# Patient Record
Sex: FEMALE | Race: WHITE | NOT HISPANIC OR LATINO | Employment: FULL TIME | URBAN - METROPOLITAN AREA
[De-identification: names, ages, dates, MRNs, and addresses within clinical notes are randomized per-mention and may not be internally consistent; named-entity substitution may affect disease eponyms.]

---

## 2018-09-18 ENCOUNTER — OFFICE VISIT CONVERTED (OUTPATIENT)
Dept: FAMILY MEDICINE CLINIC | Facility: CLINIC | Age: 53
End: 2018-09-18
Attending: NURSE PRACTITIONER

## 2018-09-18 ENCOUNTER — CONVERSION ENCOUNTER (OUTPATIENT)
Dept: FAMILY MEDICINE CLINIC | Facility: CLINIC | Age: 53
End: 2018-09-18

## 2019-05-10 ENCOUNTER — OFFICE VISIT CONVERTED (OUTPATIENT)
Dept: FAMILY MEDICINE CLINIC | Facility: CLINIC | Age: 54
End: 2019-05-10
Attending: NURSE PRACTITIONER

## 2019-05-10 ENCOUNTER — HOSPITAL ENCOUNTER (OUTPATIENT)
Dept: GENERAL RADIOLOGY | Facility: HOSPITAL | Age: 54
Discharge: HOME | End: 2019-05-10
Attending: NURSE PRACTITIONER

## 2019-06-12 ENCOUNTER — HOSPITAL ENCOUNTER (OUTPATIENT)
Dept: OTHER | Facility: HOSPITAL | Age: 54
Discharge: HOME | End: 2019-06-12
Attending: NURSE PRACTITIONER

## 2019-06-12 LAB
ALBUMIN SERPL-MCNC: 4.1 G/DL (ref 3.5–5)
ALBUMIN/GLOB SERPL: 1.4 {RATIO} (ref 1.4–2.6)
ALP SERPL-CCNC: 139 U/L (ref 53–141)
ALT SERPL-CCNC: 49 U/L (ref 10–40)
ANION GAP SERPL CALC-SCNC: 18 MMOL/L (ref 8–19)
AST SERPL-CCNC: 38 U/L (ref 15–50)
BASOPHILS # BLD AUTO: 0.03 10*3/UL (ref 0–0.2)
BASOPHILS NFR BLD AUTO: 0.5 % (ref 0–3)
BILIRUB SERPL-MCNC: 0.44 MG/DL (ref 0.2–1.3)
BUN SERPL-MCNC: 20 MG/DL (ref 5–25)
BUN/CREAT SERPL: 24 {RATIO} (ref 6–20)
CALCIUM SERPL-MCNC: 9.3 MG/DL (ref 8.7–10.4)
CHLORIDE SERPL-SCNC: 102 MMOL/L (ref 99–111)
CHOLEST SERPL-MCNC: 197 MG/DL (ref 107–200)
CHOLEST/HDLC SERPL: 3.5 {RATIO} (ref 3–6)
CONV ABS IMM GRAN: 0.01 10*3/UL (ref 0–0.2)
CONV CO2: 24 MMOL/L (ref 22–32)
CONV IMMATURE GRAN: 0.2 % (ref 0–1.8)
CONV TOTAL PROTEIN: 7 G/DL (ref 6.3–8.2)
CREAT UR-MCNC: 0.83 MG/DL (ref 0.5–0.9)
DEPRECATED RDW RBC AUTO: 40.9 FL (ref 36.4–46.3)
EOSINOPHIL # BLD AUTO: 0.11 10*3/UL (ref 0–0.7)
EOSINOPHIL # BLD AUTO: 2 % (ref 0–7)
ERYTHROCYTE [DISTWIDTH] IN BLOOD BY AUTOMATED COUNT: 12.8 % (ref 11.7–14.4)
EST. AVERAGE GLUCOSE BLD GHB EST-MCNC: 105 MG/DL
GFR SERPLBLD BASED ON 1.73 SQ M-ARVRAT: >60 ML/MIN/{1.73_M2}
GLOBULIN UR ELPH-MCNC: 2.9 G/DL (ref 2–3.5)
GLUCOSE SERPL-MCNC: 83 MG/DL (ref 65–99)
HBA1C MFR BLD: 13.1 G/DL (ref 12–16)
HBA1C MFR BLD: 5.3 % (ref 3.5–5.7)
HCT VFR BLD AUTO: 40.1 % (ref 37–47)
HDLC SERPL-MCNC: 57 MG/DL (ref 40–60)
LDLC SERPL CALC-MCNC: 118 MG/DL (ref 70–100)
LYMPHOCYTES # BLD AUTO: 1.75 10*3/UL (ref 1–5)
MCH RBC QN AUTO: 28.6 PG (ref 27–31)
MCHC RBC AUTO-ENTMCNC: 32.7 G/DL (ref 33–37)
MCV RBC AUTO: 87.6 FL (ref 81–99)
MONOCYTES # BLD AUTO: 0.46 10*3/UL (ref 0.2–1.2)
MONOCYTES NFR BLD AUTO: 8.3 % (ref 3–10)
NEUTROPHILS # BLD AUTO: 3.21 10*3/UL (ref 2–8)
NEUTROPHILS NFR BLD AUTO: 57.6 % (ref 30–85)
NRBC CBCN: 0 % (ref 0–0.7)
OSMOLALITY SERPL CALC.SUM OF ELEC: 290 MOSM/KG (ref 273–304)
PLATELET # BLD AUTO: 220 10*3/UL (ref 130–400)
PMV BLD AUTO: 10.6 FL (ref 9.4–12.3)
POTASSIUM SERPL-SCNC: 4.9 MMOL/L (ref 3.5–5.3)
RBC # BLD AUTO: 4.58 10*6/UL (ref 4.2–5.4)
SODIUM SERPL-SCNC: 139 MMOL/L (ref 135–147)
T4 FREE SERPL-MCNC: 1.4 NG/DL (ref 0.9–1.8)
TRIGL SERPL-MCNC: 109 MG/DL (ref 40–150)
TSH SERPL-ACNC: 1.65 M[IU]/L (ref 0.27–4.2)
VARIANT LYMPHS NFR BLD MANUAL: 31.4 % (ref 20–45)
VLDLC SERPL-MCNC: 22 MG/DL (ref 5–37)
WBC # BLD AUTO: 5.57 10*3/UL (ref 4.8–10.8)

## 2019-06-25 ENCOUNTER — OFFICE VISIT CONVERTED (OUTPATIENT)
Dept: FAMILY MEDICINE CLINIC | Facility: CLINIC | Age: 54
End: 2019-06-25
Attending: NURSE PRACTITIONER

## 2019-06-25 ENCOUNTER — CONVERSION ENCOUNTER (OUTPATIENT)
Dept: FAMILY MEDICINE CLINIC | Facility: CLINIC | Age: 54
End: 2019-06-25

## 2019-06-28 ENCOUNTER — HOSPITAL ENCOUNTER (OUTPATIENT)
Dept: GENERAL RADIOLOGY | Facility: HOSPITAL | Age: 54
Discharge: HOME | End: 2019-06-28
Attending: NURSE PRACTITIONER

## 2019-07-12 ENCOUNTER — HOSPITAL ENCOUNTER (OUTPATIENT)
Dept: GENERAL RADIOLOGY | Facility: HOSPITAL | Age: 54
Discharge: HOME | End: 2019-07-12
Attending: NURSE PRACTITIONER

## 2019-07-12 ENCOUNTER — CONVERSION ENCOUNTER (OUTPATIENT)
Dept: FAMILY MEDICINE CLINIC | Facility: CLINIC | Age: 54
End: 2019-07-12

## 2019-07-12 ENCOUNTER — OFFICE VISIT CONVERTED (OUTPATIENT)
Dept: FAMILY MEDICINE CLINIC | Facility: CLINIC | Age: 54
End: 2019-07-12
Attending: NURSE PRACTITIONER

## 2019-07-26 ENCOUNTER — HOSPITAL ENCOUNTER (OUTPATIENT)
Dept: PHYSICAL THERAPY | Facility: CLINIC | Age: 54
Setting detail: RECURRING SERIES
Discharge: HOME | End: 2019-09-19
Attending: NURSE PRACTITIONER

## 2019-08-12 ENCOUNTER — CONVERSION ENCOUNTER (OUTPATIENT)
Dept: FAMILY MEDICINE CLINIC | Facility: CLINIC | Age: 54
End: 2019-08-12

## 2019-08-12 ENCOUNTER — OFFICE VISIT CONVERTED (OUTPATIENT)
Dept: FAMILY MEDICINE CLINIC | Facility: CLINIC | Age: 54
End: 2019-08-12
Attending: NURSE PRACTITIONER

## 2019-09-17 ENCOUNTER — OFFICE VISIT CONVERTED (OUTPATIENT)
Dept: FAMILY MEDICINE CLINIC | Facility: CLINIC | Age: 54
End: 2019-09-17
Attending: NURSE PRACTITIONER

## 2019-09-17 ENCOUNTER — CONVERSION ENCOUNTER (OUTPATIENT)
Dept: FAMILY MEDICINE CLINIC | Facility: CLINIC | Age: 54
End: 2019-09-17

## 2020-02-13 ENCOUNTER — OFFICE VISIT CONVERTED (OUTPATIENT)
Dept: FAMILY MEDICINE CLINIC | Facility: CLINIC | Age: 55
End: 2020-02-13
Attending: NURSE PRACTITIONER

## 2020-02-13 ENCOUNTER — CONVERSION ENCOUNTER (OUTPATIENT)
Dept: FAMILY MEDICINE CLINIC | Facility: CLINIC | Age: 55
End: 2020-02-13

## 2020-02-17 ENCOUNTER — HOSPITAL ENCOUNTER (OUTPATIENT)
Dept: OTHER | Facility: HOSPITAL | Age: 55
Discharge: HOME | End: 2020-02-17
Attending: NURSE PRACTITIONER

## 2020-02-17 LAB
25(OH)D3 SERPL-MCNC: 15.6 NG/ML (ref 30–100)
ALBUMIN SERPL-MCNC: 4.3 G/DL (ref 3.5–5)
ALBUMIN/GLOB SERPL: 1.4 {RATIO} (ref 1.4–2.6)
ALP SERPL-CCNC: 128 U/L (ref 53–141)
ALT SERPL-CCNC: 58 U/L (ref 10–40)
ANION GAP SERPL CALC-SCNC: 20 MMOL/L (ref 8–19)
AST SERPL-CCNC: 32 U/L (ref 15–50)
BASOPHILS # BLD AUTO: 0.04 10*3/UL (ref 0–0.2)
BASOPHILS NFR BLD AUTO: 0.6 % (ref 0–3)
BILIRUB SERPL-MCNC: 0.35 MG/DL (ref 0.2–1.3)
BUN SERPL-MCNC: 18 MG/DL (ref 5–25)
BUN/CREAT SERPL: 22 {RATIO} (ref 6–20)
CALCIUM SERPL-MCNC: 10.1 MG/DL (ref 8.7–10.4)
CHLORIDE SERPL-SCNC: 98 MMOL/L (ref 99–111)
CHOLEST SERPL-MCNC: 249 MG/DL (ref 107–200)
CHOLEST/HDLC SERPL: 4.5 {RATIO} (ref 3–6)
CONV ABS IMM GRAN: 0.01 10*3/UL (ref 0–0.2)
CONV CO2: 26 MMOL/L (ref 22–32)
CONV IMMATURE GRAN: 0.2 % (ref 0–1.8)
CONV TOTAL PROTEIN: 7.4 G/DL (ref 6.3–8.2)
CREAT UR-MCNC: 0.83 MG/DL (ref 0.5–0.9)
DEPRECATED RDW RBC AUTO: 43.5 FL (ref 36.4–46.3)
EOSINOPHIL # BLD AUTO: 0.26 10*3/UL (ref 0–0.7)
EOSINOPHIL # BLD AUTO: 4 % (ref 0–7)
ERYTHROCYTE [DISTWIDTH] IN BLOOD BY AUTOMATED COUNT: 13.4 % (ref 11.7–14.4)
EST. AVERAGE GLUCOSE BLD GHB EST-MCNC: 117 MG/DL
GFR SERPLBLD BASED ON 1.73 SQ M-ARVRAT: >60 ML/MIN/{1.73_M2}
GLOBULIN UR ELPH-MCNC: 3.1 G/DL (ref 2–3.5)
GLUCOSE SERPL-MCNC: 93 MG/DL (ref 65–99)
HBA1C MFR BLD: 5.7 % (ref 3.5–5.7)
HCT VFR BLD AUTO: 42 % (ref 37–47)
HDLC SERPL-MCNC: 55 MG/DL (ref 40–60)
HGB BLD-MCNC: 13.3 G/DL (ref 12–16)
LDLC SERPL CALC-MCNC: 150 MG/DL (ref 70–100)
LYMPHOCYTES # BLD AUTO: 2.45 10*3/UL (ref 1–5)
LYMPHOCYTES NFR BLD AUTO: 37.5 % (ref 20–45)
MCH RBC QN AUTO: 28 PG (ref 27–31)
MCHC RBC AUTO-ENTMCNC: 31.7 G/DL (ref 33–37)
MCV RBC AUTO: 88.4 FL (ref 81–99)
MONOCYTES # BLD AUTO: 0.53 10*3/UL (ref 0.2–1.2)
MONOCYTES NFR BLD AUTO: 8.1 % (ref 3–10)
NEUTROPHILS # BLD AUTO: 3.24 10*3/UL (ref 2–8)
NEUTROPHILS NFR BLD AUTO: 49.6 % (ref 30–85)
NRBC CBCN: 0 % (ref 0–0.7)
OSMOLALITY SERPL CALC.SUM OF ELEC: 292 MOSM/KG (ref 273–304)
PLATELET # BLD AUTO: 306 10*3/UL (ref 130–400)
PMV BLD AUTO: 10.5 FL (ref 9.4–12.3)
POTASSIUM SERPL-SCNC: 4.3 MMOL/L (ref 3.5–5.3)
RBC # BLD AUTO: 4.75 10*6/UL (ref 4.2–5.4)
SODIUM SERPL-SCNC: 140 MMOL/L (ref 135–147)
TRIGL SERPL-MCNC: 221 MG/DL (ref 40–150)
TSH SERPL-ACNC: 2.77 M[IU]/L (ref 0.27–4.2)
VLDLC SERPL-MCNC: 44 MG/DL (ref 5–37)
WBC # BLD AUTO: 6.53 10*3/UL (ref 4.8–10.8)

## 2020-04-14 ENCOUNTER — TELEMEDICINE CONVERTED (OUTPATIENT)
Dept: FAMILY MEDICINE CLINIC | Facility: CLINIC | Age: 55
End: 2020-04-14
Attending: NURSE PRACTITIONER

## 2020-04-30 ENCOUNTER — TELEMEDICINE CONVERTED (OUTPATIENT)
Dept: FAMILY MEDICINE CLINIC | Facility: CLINIC | Age: 55
End: 2020-04-30
Attending: NURSE PRACTITIONER

## 2020-08-06 ENCOUNTER — HOSPITAL ENCOUNTER (OUTPATIENT)
Dept: LAB | Facility: HOSPITAL | Age: 55
Discharge: HOME | End: 2020-08-06
Attending: NURSE PRACTITIONER

## 2020-08-06 LAB
CHOLEST SERPL-MCNC: 245 MG/DL (ref 107–200)
CHOLEST/HDLC SERPL: 5.6 {RATIO} (ref 3–6)
HDLC SERPL-MCNC: 44 MG/DL (ref 40–60)
LDLC SERPL CALC-MCNC: 155 MG/DL (ref 70–100)
TRIGL SERPL-MCNC: 231 MG/DL (ref 40–150)
VLDLC SERPL-MCNC: 46 MG/DL (ref 5–37)

## 2020-08-13 ENCOUNTER — TELEMEDICINE CONVERTED (OUTPATIENT)
Dept: FAMILY MEDICINE CLINIC | Facility: CLINIC | Age: 55
End: 2020-08-13
Attending: NURSE PRACTITIONER

## 2020-09-09 ENCOUNTER — OFFICE VISIT CONVERTED (OUTPATIENT)
Dept: FAMILY MEDICINE CLINIC | Facility: CLINIC | Age: 55
End: 2020-09-09
Attending: NURSE PRACTITIONER

## 2020-09-09 ENCOUNTER — CONVERSION ENCOUNTER (OUTPATIENT)
Dept: FAMILY MEDICINE CLINIC | Facility: CLINIC | Age: 55
End: 2020-09-09

## 2020-12-03 ENCOUNTER — HOSPITAL ENCOUNTER (OUTPATIENT)
Dept: LAB | Facility: HOSPITAL | Age: 55
Discharge: HOME | End: 2020-12-03
Attending: NURSE PRACTITIONER

## 2020-12-03 LAB
25(OH)D3 SERPL-MCNC: 17.7 NG/ML (ref 30–100)
ALBUMIN SERPL-MCNC: 4.3 G/DL (ref 3.5–5)
ALBUMIN/GLOB SERPL: 1.5 {RATIO} (ref 1.4–2.6)
ALP SERPL-CCNC: 113 U/L (ref 53–141)
ALT SERPL-CCNC: 28 U/L (ref 10–40)
ANION GAP SERPL CALC-SCNC: 20 MMOL/L (ref 8–19)
AST SERPL-CCNC: 27 U/L (ref 15–50)
BASOPHILS # BLD AUTO: 0.04 10*3/UL (ref 0–0.2)
BASOPHILS NFR BLD AUTO: 0.7 % (ref 0–3)
BILIRUB SERPL-MCNC: <0.15 MG/DL (ref 0.2–1.3)
BUN SERPL-MCNC: 19 MG/DL (ref 5–25)
BUN/CREAT SERPL: 20 {RATIO} (ref 6–20)
CALCIUM SERPL-MCNC: 9.8 MG/DL (ref 8.7–10.4)
CHLORIDE SERPL-SCNC: 103 MMOL/L (ref 99–111)
CHOLEST SERPL-MCNC: 266 MG/DL (ref 107–200)
CHOLEST/HDLC SERPL: 7.4 {RATIO} (ref 3–6)
CONV ABS IMM GRAN: 0.01 10*3/UL (ref 0–0.2)
CONV CO2: 21 MMOL/L (ref 22–32)
CONV IMMATURE GRAN: 0.2 % (ref 0–1.8)
CONV TOTAL PROTEIN: 7.2 G/DL (ref 6.3–8.2)
CREAT UR-MCNC: 0.96 MG/DL (ref 0.5–0.9)
DEPRECATED RDW RBC AUTO: 40.3 FL (ref 36.4–46.3)
EOSINOPHIL # BLD AUTO: 0.28 10*3/UL (ref 0–0.7)
EOSINOPHIL # BLD AUTO: 4.8 % (ref 0–7)
ERYTHROCYTE [DISTWIDTH] IN BLOOD BY AUTOMATED COUNT: 12.6 % (ref 11.7–14.4)
GFR SERPLBLD BASED ON 1.73 SQ M-ARVRAT: >60 ML/MIN/{1.73_M2}
GLOBULIN UR ELPH-MCNC: 2.9 G/DL (ref 2–3.5)
GLUCOSE SERPL-MCNC: 90 MG/DL (ref 65–99)
HCT VFR BLD AUTO: 40.2 % (ref 37–47)
HDLC SERPL-MCNC: 36 MG/DL (ref 40–60)
HGB BLD-MCNC: 12.6 G/DL (ref 12–16)
LDLC SERPL CALC-MCNC: 116 MG/DL (ref 70–100)
LYMPHOCYTES # BLD AUTO: 2.39 10*3/UL (ref 1–5)
LYMPHOCYTES NFR BLD AUTO: 41.1 % (ref 20–45)
MCH RBC QN AUTO: 27.6 PG (ref 27–31)
MCHC RBC AUTO-ENTMCNC: 31.3 G/DL (ref 33–37)
MCV RBC AUTO: 88 FL (ref 81–99)
MONOCYTES # BLD AUTO: 0.56 10*3/UL (ref 0.2–1.2)
MONOCYTES NFR BLD AUTO: 9.6 % (ref 3–10)
NEUTROPHILS # BLD AUTO: 2.54 10*3/UL (ref 2–8)
NEUTROPHILS NFR BLD AUTO: 43.6 % (ref 30–85)
NRBC CBCN: 0 % (ref 0–0.7)
OSMOLALITY SERPL CALC.SUM OF ELEC: 290 MOSM/KG (ref 273–304)
PLATELET # BLD AUTO: 256 10*3/UL (ref 130–400)
PMV BLD AUTO: 10.5 FL (ref 9.4–12.3)
POTASSIUM SERPL-SCNC: 4.6 MMOL/L (ref 3.5–5.3)
RBC # BLD AUTO: 4.57 10*6/UL (ref 4.2–5.4)
SODIUM SERPL-SCNC: 139 MMOL/L (ref 135–147)
TRIGL SERPL-MCNC: 676 MG/DL (ref 40–150)
WBC # BLD AUTO: 5.82 10*3/UL (ref 4.8–10.8)

## 2020-12-09 ENCOUNTER — OFFICE VISIT CONVERTED (OUTPATIENT)
Dept: FAMILY MEDICINE CLINIC | Facility: CLINIC | Age: 55
End: 2020-12-09
Attending: STUDENT IN AN ORGANIZED HEALTH CARE EDUCATION/TRAINING PROGRAM

## 2021-02-17 ENCOUNTER — HOSPITAL ENCOUNTER (OUTPATIENT)
Dept: LAB | Facility: HOSPITAL | Age: 56
Discharge: HOME | End: 2021-02-17
Attending: STUDENT IN AN ORGANIZED HEALTH CARE EDUCATION/TRAINING PROGRAM

## 2021-02-17 LAB
CHOLEST SERPL-MCNC: 252 MG/DL (ref 107–200)
CHOLEST/HDLC SERPL: 3.7 {RATIO} (ref 3–6)
HDLC SERPL-MCNC: 69 MG/DL (ref 40–60)
LDLC SERPL CALC-MCNC: 164 MG/DL (ref 70–100)
TRIGL SERPL-MCNC: 93 MG/DL (ref 40–150)
VLDLC SERPL-MCNC: 19 MG/DL (ref 5–37)

## 2021-02-26 ENCOUNTER — OFFICE VISIT CONVERTED (OUTPATIENT)
Dept: FAMILY MEDICINE CLINIC | Facility: CLINIC | Age: 56
End: 2021-02-26
Attending: STUDENT IN AN ORGANIZED HEALTH CARE EDUCATION/TRAINING PROGRAM

## 2021-03-04 ENCOUNTER — HOSPITAL ENCOUNTER (OUTPATIENT)
Dept: GENERAL RADIOLOGY | Facility: HOSPITAL | Age: 56
Discharge: HOME | End: 2021-03-04
Attending: STUDENT IN AN ORGANIZED HEALTH CARE EDUCATION/TRAINING PROGRAM

## 2021-05-12 NOTE — PROGRESS NOTES
Progress Note      Patient Name: Barb Clinton   Patient ID: [id number]   Sex: Female   YOB: 1965    Primary Care Provider: Anu JENNINGS   Referring Provider: Anu JENNINGS    Visit Date: April 30, 2020    Provider: MICHAELA Gonzales   Location: [facility]   Location Address: [address] [place] [address], [address] [place] [address]  [id number]   Location Phone: [phone]          Chief Complaint  · f/u  · needs to recheck her vitamin d and lipids  · has trouble with restless legs and the requip is causing nausea      History Of Present Illness  Barb Clinton is a 55 year old /White female who presents for evaluation and treatment of: following up on RLS med   Video Conferencing Visit  Barb Clinton is a 55 year old /White female who is presenting for evaluation via video conferencing. Verbal consent obtained before beginning visit. pt alone for her visit   The following staff were present during this visit: John SMITH      HPI: patient has RLS and the requip caused nausea no matter what she did. she has been on gabapentin before and it did help at that time.            Past Medical History  Disease Name Date Onset Notes   Hypercholesterolemia --  --          Past Surgical History  Procedure Name Date Notes   EGD --  --          Medication List  Name Date Started Instructions   atorvastatin 20 mg oral tablet 01/31/2020 TAKE 1 TABLET BY MOUTH ONCE DAILY AT BEDTIME   cyclobenzaprine 10 mg oral tablet 11/12/2019 take 1 tablet (10 mg) by oral route at bedtime for 30 days   Estroven Maximum Strength 400 mcg oral tablet 09/18/2018 take 1 tablet by oral route at bedtime   gabapentin 100 mg oral capsule 04/30/2020 take 1 capsule by oral route QHS for RLS   lisinopril 20 mg oral tablet 04/21/2020 take 1 tablet (20 mg) by oral route once daily for 30 days for 90 days   Vitamin D2 50,000 unit oral capsule 02/18/2020 take 1 capsule (50,000 unit) by oral route once  weekly x 12 weeks         Allergy List  Allergen Name Date Reaction Notes   NO KNOWN DRUG ALLERGIES --  --  --        Allergies Reconciled  Social History  Finding Status Start/Stop Quantity Notes   Tobacco Former --/-- --  quit 12/2017         Review of Systems  · Constitutional  o Denies  o : fatigue, night sweats  · Eyes  o Denies  o : double vision, blurred vision  · HENT  o Denies  o : vertigo, recent head injury  · Cardiovascular  o Denies  o : chest pain, irregular heart beats  · Respiratory  o Denies  o : shortness of breath, productive cough  · Gastrointestinal  o Denies  o : nausea, vomiting  · Genitourinary  o Denies  o : dysuria, urinary retention  · Integument  o Denies  o : hair growth change, new skin lesions  · Neurologic  o Denies  o : altered mental status, seizures  · Musculoskeletal  o Denies  o : joint swelling, limitation of motion  · Endocrine  o Admits  o : pain tingling, level 6 and feeling something being on her leg. can't sleep well due to this. requip made her nauseated  o Denies  o : cold intolerance, heat intolerance      Physical Examination  · Constitutional  o Appearance  o : well-nourished, well developed, alert, in no acute distress  · Eyes  o Ophthalmoscopic Exam  o : retinas are normal without hemorrhage or exudate  · Respiratory  o Respiratory Effort  o : breathing unlabored          Assessment  · Essential hypertension     401.9/I10  · Hyperlipidemia     272.4/E78.5  · Pain in both lower legs       Pain in right lower leg     729.5/M79.661  Pain in left lower leg     729.5/M79.662  · RLS (restless legs syndrome)     333.94/G25.81      Plan  · Orders  o Lipid Panel [facility] (94295) - 272.4/E78.5 - 07/30/2020  o ANATOLY Report (KASPR) - - 04/30/2020  o ACO-39: Current medications updated and reviewed () - - 04/30/2020  · Medications  o ropinirole 1 mg oral tablet   SIG: take 1 tablet (1 mg) by oral route 1-3 hours before bedtime for 30 days   DISP: (30) tablets with 0  refills  Discontinued on 04/30/2020     o Medications have been Reconciled  o Transition of Care or Provider Policy  · Instructions  o Advised that cheeses and other sources of dairy fats, animal fats, fast food, and the extras (candy, pastries, pies, doughnuts and cookies) all contain LDL raising nutrients. Advised to increase fruits, vegetables, whole grains, and to monitor portion sizes.   o Patient was educated/instructed on their diagnosis, treatment and medications prior to discharge from the clinic today.  o will start at 100mg and may titrate as needed  o pt will come by for UDS and consent            Electronically Signed by: Anu Post APRN -Author on April 30, 2020 04:43:07 PM

## 2021-05-12 NOTE — PROGRESS NOTES
Quick Note      Patient Name: Barb Clinton   Patient ID: [id number]   Sex: Female   YOB: 1965    Primary Care Provider: Anu JENNINGS   Referring Provider: Anu JENNINGS    Visit Date: April 14, 2020    Provider: MICHAELA Gonzales   Location: [facility]   Location Address: [address] [place] [address], [address] [place] [address]  [id number]   Location Phone: [phone]          History Of Present Illness  TELEHEALTH VISIT  Chief Complaint: f/u   Barb Clinton is a 55 year old /White female who is presenting for evaluation via TELEPHONE vist. verbal consent obtained before beginning visit. pt was alone for visit   Provider spent 15 minutes with the patient during telehealth visit.   The following staff were present during this visit: no one was present   Past Medical History/Overview of Patient Symptoms       HPI: pt says she has been monitoring her blood pressure and they have been good. she was wondering about decrease in the b/p med. also having l ot more aching and pain in both of her knees. she has taken Tylenol but is concerned about effect on her liver.    HPI:   cardio; blood pressures have been in less than 130's/80's. she's had no probs with the medication.  cardio; denies having any headaches , swelling in her feet or legs  muscul:  she says she had done more walking lately due to being in the house more because of the coronavirus precautions. whitle we were talking she also said her legs are so restless at night. she can wear a spot on her new sheets. she says it just feels like muscles are tense and then other times it feels like something is on her legs. this has been going on for a longtime.  denies: any radiating pain from hips , or any pain in her feet           Assessment  · Essential hypertension     401.9/I10  · Vitamin D deficiency     268.9/E55.9  · Hypercholesterolemia     272.0/E78.00  · Restless leg syndrome     333.94/G25.81  · Knee pain,  bilateral       Pain in right knee     719.46/M25.561  Pain in left knee     719.46/M25.562      Plan  · Medications  o Voltaren 1 % topical gel   SIG: apply 2 grams to the affected area(s) by topical route 3 times per day   DISP: (1) 100 gm tube with 0 refills  Prescribed on 04/14/2020     o ropinirole 1 mg oral tablet   SIG: take 1 tablet (1 mg) by oral route 1-3 hours before bedtime for 30 days   DISP: (30) tablets with 0 refills  Prescribed on 04/14/2020     o Medications have been Reconciled  o Transition of Care or Provider Policy  · Instructions  o Plan Of Care:   o Take all medications as prescribed/directed.  o we discussed her leg movement consistent with restless leg syndrome. will try requip and then can titrate the dose.  o discussed using the voltaren ge  o discussed using the voltaren gel on her knees and then w  o we discussed using voltaren gel and then wrpping with her ace for warmth. also it may give a little support if she has to do a lot of walking  o her knee pain persists may consider MRI. we reviewed previous labs and xrays of her knees  o she's to take amlodipine 10ng and monitor her b/p. f/u in 2 weeks and will see if it maintains her blood pressure  · Disposition  o Return Visit Request in/on 2 weeks +/- 2 days ([id number]).            Electronically Signed by: MICHAELA Gonzales -Author on April 14, 2020 04:36:10 PM

## 2021-05-13 NOTE — PROGRESS NOTES
Progress Note      Patient Name: Barb Clinton   Patient ID: [id number]   Sex: Female   YOB: 1965    Primary Care Provider: Anu JENNINGS   Referring Provider: Anu JENNINGS    Visit Date: December 9, 2020    Provider: Desiree Dominguez MD   Location: [facility]   Location Address: [address] [place] [address], [address] [place] [address]  [id number]   Location Phone: [phone]          Chief Complaint     F/u on labs, c/o RLS getting worse.       History Of Present Illness  Barb Clinton is a 55 year old /White female who presents for evaluation and treatment of:      55 years old female with past medical history of restless leg syndrome, recently diagnosed with hypertriglyceridemia, hypertension and chronic joint pain comes to the clinic today to follow-up on chronic conditions.    Restless leg syndrome patient is taking gabapentin 100 mg at bedtime.  Patient reports that symptoms are getting worse.    Hypertriglyceridemia; last blood work showed elevated triglyceride to almost 700.  Patient was started on fenofibrate and was advised to stop statin due to muscle pain.  Patient to take    Hypertension; controlled on medication    Patient is taking baclofen and Flexeril for chronic back pain/chronic neck pain and bilateral knee pain.    Patient reports she is very active denies any chest pain/shortness of breath.           Past Medical History  Disease Name Date Onset Notes   Hypercholesterolemia --  --          Past Surgical History  Procedure Name Date Notes   EGD --  --          Medication List  Name Date Started Instructions   cyclobenzaprine 10 mg oral tablet 09/10/2020 TAKE 1 TABLET BY MOUTH AT BEDTIME   Estroven Maximum Strength 400 mcg oral tablet 09/18/2018 take 1 tablet by oral route at bedtime   lisinopril 20 mg oral tablet 08/24/2020 TAKE 1 TABLET BY MOUTH ONCE DAILY   Tricor 145 mg oral tablet 12/04/2020 take 1 tablet (145 mg) by oral route once  "daily for 30 days         Allergy List  Allergen Name Date Reaction Notes   NO KNOWN DRUG ALLERGIES --  --  --        Allergies Reconciled  Social History  Finding Status Start/Stop Quantity Notes   Tobacco Former --/-- --  quit 12/2017         Review of Systems  · Constitutional  o Denies  o : fatigue, fever  · Eyes  o Denies  o : discharge from eye, redness  · HENT  o Denies  o : headaches, congestion  · Cardiovascular  o Denies  o : chest pain, palpitations  · Respiratory  o Denies  o : shortness of breath, wheezing, cough  · Gastrointestinal  o Denies  o : vomiting, diarrhea, constipation  · Genitourinary  o Denies  o : dysuria, hematuria  · Integument  o Denies  o : rash, new skin lesions  · Neurologic  o Admits  o : RLS  o Denies  o : altered mental status, seizures  · Musculoskeletal  o Admits  o : Chronic joint pain  o Denies  o : weakness, joint swelling  · Psychiatric  o Denies  o : anxiety, depression  · Heme-Lymph  o Denies  o : lymph node enlargement, tenderness      Vitals  Date Time BP Position Site L\R Cuff Size HR RR TEMP (F) WT  HT  BMI kg/m2 BSA m2 O2 Sat FR L/min FiO2 HC       12/09/2020 10:36 /71 Sitting    71 - R 16 97.7 170lbs 5oz 5'  4\" 29.23 1.87 98 %  21%          Physical Examination  · Constitutional  o Appearance  o : alert, in no acute distress, well developed, well-nourished  · Head and Face  o Head  o : normocephalic, atraumatic, non tender, no palpable masses or nodules.  o Face  o : no facial lesions  · Eyes  o Vision  o : Acuity: grossly normal at distance, Conjuntivae: Normal, Sclerae white, Pupils: PERRL, Cornea: Clear, no lesions bilateral  · Neck  o Inspection/Palpation  o : Supple, no masses or tenderness, no deformities, Trachea: Midline, ROM: with in normal limits, no neck stiffness  o Thyroid  o : no thyomegaly, no palpabale masses   · Respiratory  o Auscultation of Lungs  o : normal breath sounds throughout  · Cardiovascular  o Heart  o : Regular rate and rhythm, " Normal S1,S2 , No cardiac murmers, No S3 or S4 gallop or rubs  · Gastrointestinal  o Abdominal Examination  o : abdomen soft, nontender, non distended, no rigidity, gaurding, rebound tenderness, no ventral or inguinal hernias present  o Liver and spleen  o : no hepatomegaly present, liver nontender to palpation, spleen not palpable  · Musculoskeletal  o General  o :   § General Musculoskeletal  § : No joint swelling or deformity., Muscle tone, strength, and development grossly normal.  · Skin and Subcutaneous Tissue  o General Inspection  o : no rashes , or lesions present, normal skin color, warm and dry  o Digits and Nails  o : no clubbing, cyanosis, deformities or edema present, normal appearing nails  · Neurologic  o Mental Status Examination  o : alert and oriented to time, place, and person., Cranial Nerves: grossly intact, Motor Exam: no focal motor deficits, normal bulk tone and power, No abnormal movements., Reflexes: DTR +2, bilateral upper and lower extremities, Sensation: no focal sensory deficits, Gait and Station: normal gait, able to stand without difficulty  · Psychiatric  o Mood and Affect  o : normal mood and affect          Results  · In-Office Procedures  o Lab procedure  § IOP - Urine Drug Screen In-House [facility] ([id number])   § Amphetamines Ur Ql: Negative   § Barbiturates Ur Ql: Negative   § Buprenorphine+Nor Ur Ql Scn: Negative   § Benzodiaz Ur Ql: Negative   § Cocaine Ur Ql: Negative   § Methadone Ur Ql: Negative   § Methamphet Ur Ql: Negative   § MDMA Ur Ql Scn: Negative   § Opiates Ur Ql: Negative   § Oxycodone Ur Ql: Negative   § PCP Ur Ql: Negative   § THC Ur Ql: Negative   § Temp in Range?: Within/Acceptable   § Control Seen?: Yes       Assessment  · Hypertriglyceridemia     272.1/E78.1  --Patient to take fenofibrate for 2 months; repeat blood work after 2 months before the next visit at the end of February  --Lifestyle modifications discussed in great detail with healthy diet and  exercise  · HTN (hypertension)     401.9/I10  --Controlled  · Chronic back pain       Dorsalgia, unspecified     724.5/M54.9  Other chronic pain     724.5/G89.29  · Restless leg syndrome     333.94/G25.81  --Uncontrolled  --We will increase gabapentin to 200 mg at bedtime  --Urine drug screen reviewed  --Contract for controlled substances signed  · Former cigar smoker     V15.82/Z87.891  --Patient has smoked for more than 30 years in the past and quit smoking less than 15 years ago  --We will get low-dose CT      Plan  · Orders  o Lipid Panel [facility] ([id number]) - 272.1/E78.1 - 02/09/2021  o ACO-39: Current medications updated and reviewed (1159F, ) - - 12/09/2020  o ACO-14: Influenza immunization was not administered for reasons documented [facility] () - - 12/09/2020  o Low dose CT scan (LDCT) for lung cancer screening [facility] () - - 12/09/2020  · Medications  o gabapentin 100 mg oral capsule   SIG: take 2 capsules by oral route daily for 30 days   DISP: (60) Capsule with 2 refills  Prescribed on 12/09/2020     o gabapentin 100 mg oral capsule   SIG: take 1 capsule by oral route QHS for RLS   DISP: (30) capsules with 0 refills  Discontinued on 12/09/2020     o Medications have been Reconciled  o Transition of Care or Provider Policy  · Instructions  o Patient was educated/instructed on their diagnosis, treatment and medications prior to discharge from the clinic today.  o Patient was instructed to exercise regularly.  o Patient instructed to seek medical attention urgently for new or worsening symptoms.  o Call the office with any concerns or questions.  o Bring all medicines with their bottles to each office visit.  o Minutes spent with patient including greater than 50% in Education/Counseling/Care Coordination.  o Time spent with the patient was minutes, more than 50% face to face.  o Discussed Covid-19 precautions including, but not limited to, social distancing, avoid touching your face, and hand washing.    · Disposition  o Call or Return if symptoms worsen or persist.  o Follow Up in 3 months.            Electronically Signed by: Desiree Dominguez MD -Author on December 9, 2020 11:59:49 AM

## 2021-05-13 NOTE — PROGRESS NOTES
Progress Note      Patient Name: Barb Clinton   Patient ID: [id number]   Sex: Female   YOB: 1965    Primary Care Provider: Anu JENNINGS   Referring Provider: Anu JENNINGS    Visit Date: August 13, 2020    Provider: MICHAELA Gonzales   Location: [facility]   Location Address: [address] [place] [address], [address]  [id number]   Location Phone: [phone]          Chief Complaint  · 6 month followup  · no new concerns      History Of Present Illness  TELEHEALTH TELEPHONE VISIT  Barb Clinton is a 55 year old /White female who is presenting for evaluation via telehealth telephone visit. Verbal consent obtained before beginning visit. pt alone for visit.   Provider spent 15 minutes with patient during telehealth visit.   The following staff were present during this visit: Ama SMITH   Past Medical History/Overview of Patient Symptoms  Barb Clinton is a 55 year old /White female who presents for evaluation and treatment of: f/u labs       Past Medical History  Disease Name Date Onset Notes   Hypercholesterolemia --  --          Past Surgical History  Procedure Name Date Notes   EGD --  --          Medication List  Name Date Started Instructions   atorvastatin 20 mg oral tablet 01/31/2020 TAKE 1 TABLET BY MOUTH ONCE DAILY AT BEDTIME   cyclobenzaprine 10 mg oral tablet 05/01/2020 TAKE 1 TABLET BY MOUTH EVERY NIGHT AT BEDTIME   Estroven Maximum Strength 400 mcg oral tablet 09/18/2018 take 1 tablet by oral route at bedtime   gabapentin 100 mg oral capsule 07/21/2020 take 1 capsule by oral route QHS for RLS   lisinopril 20 mg oral tablet 04/21/2020 take 1 tablet (20 mg) by oral route once daily for 30 days for 90 days         Allergy List  Allergen Name Date Reaction Notes   NO KNOWN DRUG ALLERGIES --  --  --          Social History  Finding Status Start/Stop Quantity Notes   Tobacco Former --/-- --  quit 12/2017         Review of  Systems  · Constitutional  o Admits  o : weight gain. she is now caring for her mother and uncle and this does cause some of her fatigue  o Denies  o : body aches, night sweats, fatigue  · Cardiovascular  o Admits  o : has not been checking her b/p  o Denies  o : irregular heart beats, chest pain  · Gastrointestinal  o Denies  o : loss of appetite, n/v  · Genitourinary  o Admits  o : dysuria  o Denies  o : urgency, frequency, urinary retention  · Neurologic  o Admits  o : the gabapentin helps with her RLS  o Denies  o : altered mental status, incoordination, memory difficulties, seizures  · Musculoskeletal  o Admits  o : has some back aches  o Denies  o : joint pain, limitation of motion          Assessment  · Fatigue     780.79/R53.83  · Vitamin D deficiency     268.9/E55.9  · Hypercholesterolemia     272.0/E78.00  · Back pain     724.5/M54.9  · Restless leg     333.94/G25.81      Plan  · Orders  o CBC with Auto Diff [facility] ([id number]) - 780.79/R53.83 - 11/13/2020  o Lipid Panel [facility] ([id number]) - 272.0/E78.00 - 11/13/2020  o Vitamin D (25-Hydroxy) Level (36167) - 268.9/E55.9 - 11/13/2020  o ACO-39: Current medications updated and reviewed () - - 08/13/2020  o Vitamin D (25-Hydroxy) Level (02305) - 268.9/E55.9 - 08/13/2020  · Medications  o Medications have been Reconciled  o Transition of Care or Provider Policy  · Instructions  o Plan Of Care:   o Take all medications as prescribed/directed.  · Disposition  o Follow up in 6 months            Electronically Signed by: Anu Post APRN -Author on August 13, 2020 01:17:28 PM

## 2021-05-13 NOTE — PROGRESS NOTES
Progress Note      Patient Name: Barb Clinton   Patient ID: [id number]   Sex: Female   YOB: 1965    Primary Care Provider: Anu JENNINGS   Referring Provider: Anu JENNINGS    Visit Date: September 9, 2020    Provider: MICHAELA Gonzales   [facility]   Location Address: [address] [place] [address], [address]  [id number]   Location Phone: [phone]          Chief Complaint  · f/u   · no other concerns at this time       History Of Present Illness  Barb Clintno is a 55 year old /White female who presents for evaluation and treatment of: Patient here to follow-up labs. Has no new concerns       Past Medical History  Disease Name Date Onset Notes   Hypercholesterolemia --  --          Past Surgical History  Procedure Name Date Notes   EGD --  --          Medication List  Name Date Started Instructions   atorvastatin 20 mg oral tablet 01/31/2020 TAKE 1 TABLET BY MOUTH ONCE DAILY AT BEDTIME   cyclobenzaprine 10 mg oral tablet 05/01/2020 TAKE 1 TABLET BY MOUTH EVERY NIGHT AT BEDTIME   Estroven Maximum Strength 400 mcg oral tablet 09/18/2018 take 1 tablet by oral route at bedtime   gabapentin 100 mg oral capsule 07/21/2020 take 1 capsule by oral route QHS for RLS   lisinopril 20 mg oral tablet 08/24/2020 TAKE 1 TABLET BY MOUTH ONCE DAILY         Allergy List  Allergen Name Date Reaction Notes   NO KNOWN DRUG ALLERGIES --  --  --          Social History  Finding Status Start/Stop Quantity Notes   Tobacco Former --/-- --  quit 12/2017         Review of Systems  · Constitutional  o Denies  o : fatigue, night sweats  · Eyes  o Denies  o : double vision, blurred vision  · HENT  o Denies  o : vertigo, recent head injury  · Breasts  o Denies  o : abnormal changes in breast size, additional breast symptoms except as noted in the HPI  · Cardiovascular  o Denies  o : chest pain, irregular heart beats  · Respiratory  o Denies  o : shortness  "of breath, productive cough  · Gastrointestinal  o Admits  o : She states she has been trying to change her diet. She notes bradycardia is her weakness and she has been trying to cut back on it  o Denies  o : nausea, vomiting, constipation, diarrhea  · Genitourinary  o Denies  o : dysuria, urinary retention  · Integument  o Denies  o : hair growth change, new skin lesions  · Neurologic  o Denies  o : altered mental status, seizures  · Musculoskeletal  o Denies  o : joint swelling, limitation of motion  · Endocrine  o Denies  o : cold intolerance, heat intolerance  · Psychiatric  o Denies  o : anxiety, depression  · Heme-Lymph  o Denies  o : petechiae, lymph node enlargement or tenderness  · Allergic-Immunologic  o Denies  o : frequent illnesses      Vitals  Date Time BP Position Site L\R Cuff Size HR RR TEMP (F) WT  HT  BMI kg/m2 BSA m2 O2 Sat        09/09/2020 10:12 /67 Sitting    98 - R  97.9 170lbs 9oz 5'  4\" 29.28 1.87 98 %          Physical Examination  · Constitutional  o Appearance  o : well-nourished, well developed, alert, in no acute distress  · Eyes  o Conjunctivae  o : conjunctivae normal  o Sclerae  o : sclerae white  o Pupils and Irises  o : pupils equal, round, and reactive to light and accommodation bilaterally  o Corneas  o : tear film normal, no lesions present  o Eyelids/Ocular Adnexae  o : eyelid appearance normal, no exudates present, eye moisture level normal  · Neck  o Inspection/Palpation  o : normal appearance, no masses or tenderness, trachea midline, no enlarged cervical or supraclavicular lymphnodes palpated  o Thyroid  o : gland size normal, nontender, no nodules or masses present on palpation, thyroid motion normal during swallowing  · Respiratory  o Respiratory Effort  o : breathing unlabored  o Inspection of Chest  o : normal appearance, no retractions  o Auscultation of Lungs  o : normal breath sounds throughout  · Cardiovascular  o Heart  o :   § Auscultation of Heart  § : " regular rate and rhythm without murmur  · Gastrointestinal  o Abdominal Examination  o : abdomen nontender to palpation, normal bowel sounds  · Musculoskeletal  o General  o :   § General Musculoskeletal  § : No joint swelling or deformity., Muscle tone, strength, and development grossly normal.  · Skin and Subcutaneous Tissue  o General Inspection  o : no rashes or lesions present, no areas of discoloration  · Neurologic  o Mental Status Examination  o : judgement, insight intact, modd and affect appropriate  o Motor Examination  o : strength grossly intact in all four extremities  o Gait and Station  o : normal gait, able to stand without difficulty              Assessment  · Essential hypertension     401.9/I10  · Hyperlipidemia     272.4/E78.5  · Screening for depression     V79.0/Z13.89      Plan  · Orders  o ACO-18: Negative screen for clinical depression using a standardized tool () - V79.0/Z13.89 - 09/09/2020  o CMP [facility] (14712) - 401.9/I10, 272.4/E78.5 - 12/09/2020  o Lipid Panel [facility] (21512) - 272.4/E78.5 - 12/09/2020  o ACO-39: Current medications updated and reviewed () - - 09/09/2020  · Medications  o Medications have been Reconciled  o Transition of Care or Provider Policy  · Instructions  o Patient advised to monitor blood pressure (B/P) at home and journal readings. Patient informed that a B/P reading at home of more than 130/80 is considered hypertension. For readings greater udde134/90 or higher patient is advised to follow up in the office with readings for management. Patient advised to limit sodium intake.  o Patient was educated and given low cholesterol diet information.  o Advised that cheeses and other sources of dairy fats, animal fats, fast food, and the extras (candy, pastries, pies, doughnuts and cookies) all contain LDL raising nutrients. Advised to increase fruits, vegetables, whole grains, and to monitor portion sizes.   o Depression Screen completed and scanned into the EMR  under the designated folder within the patient's documents.  o Today's PHQ-9 result is __3_  o Patient was educated/instructed on their diagnosis, treatment and medications prior to discharge from the clinic today.  o Reviewed labs and discussed increasing the atorvastatin. However patient really wants to work on her diet she says before she has to take any more medication.  · Disposition  o Follow up in 6 months            Electronically Signed by: MICHAELA Gonzales -Author on September 9, 2020 11:41:03 AM

## 2021-05-14 VITALS
HEART RATE: 98 BPM | OXYGEN SATURATION: 98 % | WEIGHT: 170.56 LBS | BODY MASS INDEX: 29.12 KG/M2 | TEMPERATURE: 97.9 F | DIASTOLIC BLOOD PRESSURE: 67 MMHG | HEIGHT: 64 IN | SYSTOLIC BLOOD PRESSURE: 105 MMHG

## 2021-05-14 VITALS
HEART RATE: 88 BPM | TEMPERATURE: 97.6 F | DIASTOLIC BLOOD PRESSURE: 78 MMHG | WEIGHT: 176.31 LBS | OXYGEN SATURATION: 98 % | BODY MASS INDEX: 30.1 KG/M2 | RESPIRATION RATE: 16 BRPM | SYSTOLIC BLOOD PRESSURE: 132 MMHG | HEIGHT: 64 IN

## 2021-05-14 VITALS
OXYGEN SATURATION: 98 % | BODY MASS INDEX: 29.08 KG/M2 | RESPIRATION RATE: 16 BRPM | WEIGHT: 170.31 LBS | TEMPERATURE: 97.7 F | HEIGHT: 64 IN | SYSTOLIC BLOOD PRESSURE: 124 MMHG | DIASTOLIC BLOOD PRESSURE: 71 MMHG | HEART RATE: 71 BPM

## 2021-05-14 NOTE — PROGRESS NOTES
Progress Note      Patient Name: Barb Clinton   Patient ID: [id number]   Sex: Female   YOB: 1965    Primary Care Provider: Anu JENNINGS   Referring Provider: Anu JENNINGS    Visit Date: February 26, 2021    Provider: Desiree Dominguez MD   Location: [facility]   Location Address: [address] [place] [address], [address] [place] [address]  [id number]   Location Phone: [phone]          Chief Complaint     F/u on HTN, RLS, Back pain       History Of Present Illness  Barb Clinton is a 55 year old /White female who presents for evaluation and treatment of:      55 years old female with past medical history of restless leg syndrome, hypertriglyceridemia, hypertension and chronic joint pain comes to the clinic today to follow-up on chronic conditions and recent blood work.    Hypertriglyceridemia; patient was started on fenofibrate, repeat blood work shows controlled triglyceride.    Hypertension; chronic, controlled    Patient is taking baclofen and Flexeril for chronic pain.    Patient denies any chest pain or shortness of breath on exertion.       Past Medical History  Disease Name Date Onset Notes   Hypercholesterolemia --  --          Past Surgical History  Procedure Name Date Notes   EGD --  --          Medication List  Name Date Started Instructions   cyclobenzaprine 10 mg oral tablet 02/04/2021 TAKE 1 TABLET BY MOUTH AT BEDTIME for 60 days   diclofenac sodium 75 mg oral tablet,delayed release (DR/EC) 02/04/2021 take 1 tablet (75 mg) by oral route 2 times per day for 60 days   Estroven Maximum Strength 400 mcg oral tablet 09/18/2018 take 1 tablet by oral route at bedtime   fenofibrate nanocrystallized 145 mg oral tablet 02/04/2021 TAKE 1 TABLET BY MOUTH ONCE DAILY   gabapentin 100 mg oral capsule 12/09/2020 take 2 capsules by oral route daily for 30 days   lisinopril 20 mg oral tablet 02/04/2021 TAKE 1 TABLET BY MOUTH ONCE DAILY for 60 days         Allergy  "List  Allergen Name Date Reaction Notes   NO KNOWN DRUG ALLERGIES --  --  --        Allergies Reconciled  Social History  Finding Status Start/Stop Quantity Notes   Tobacco Former --/-- --  quit 12/2017         Immunizations  NameDate Admin Mfg Trade Name Lot Number Route Inj VIS Given VIS Publication   InfluenzaRefused 12/09/2020 NE Not Entered  NE NE     Comments:          Review of Systems  · Constitutional  o Denies  o : fatigue, fever  · Eyes  o Denies  o : discharge from eye, redness  · HENT  o Denies  o : headaches, congestion  · Cardiovascular  o Denies  o : chest pain, palpitations  · Respiratory  o Denies  o : shortness of breath, wheezing, cough  · Gastrointestinal  o Denies  o : vomiting, diarrhea, constipation  · Genitourinary  o Denies  o : dysuria, hematuria  · Integument  o Denies  o : rash, new skin lesions  · Neurologic  o Denies  o : altered mental status, seizures  · Musculoskeletal  o Denies  o : weakness, joint swelling  · Psychiatric  o Denies  o : anxiety, depression  · Heme-Lymph  o Denies  o : lymph node enlargement, tenderness      Vitals  Date Time BP Position Site L\R Cuff Size HR RR TEMP (F) WT  HT  BMI kg/m2 BSA m2 O2 Sat FR L/min FiO2 HC       02/26/2021 10:22 /78 Sitting    88 - R 16 97.6 176lbs 5oz 5'  4\" 30.26 1.9 98 %  21%          Physical Examination  · Constitutional  o Appearance  o : alert, in no acute distress, well developed, well-nourished  · Head and Face  o Head  o : normocephalic, atraumatic, non tender, no palpable masses or nodules.  o Face  o : no facial lesions  · Eyes  o Vision  o : Acuity: grossly normal at distance, Conjuntivae: Normal, Sclerae white, Pupils: PERRL, Cornea: Clear, no lesions bilateral  · Ears, Nose, Mouth and Throat  o Ears  o : Ext. Ears: Normal shape, Non tender, EACs: Normal , TMs: Normal, Hearing: intact to conversational voice bilaterally  o Nose  o : Nose: Normal shape, No external deformity, No nasal discharge, Mucosa: normal, " Septum: midline, Sinuses: Nontender   o Oral Cavity  o : Normal oral mucosa, Normal lips, Gums: normal pink, non swollen, Tongue: Normal appearance, Palate : Normal   o Throat  o : Oropharynx: no inflmation or lesions, Tonsils: within normal limits  · Neck  o Inspection/Palpation  o : Supple, no masses or tenderness, no deformities, Trachea: Midline, ROM: with in normal limits, no neck stiffness  o Thyroid  o : no thyomegaly, no palpabale masses   · Respiratory  o Auscultation of Lungs  o : normal breath sounds throughout  · Cardiovascular  o Heart  o : Regular rate and rhythm, Normal S1,S2 , No cardiac murmers, No S3 or S4 gallop or rubs  · Gastrointestinal  o Abdominal Examination  o : abdomen soft, nontender, non distended, no rigidity, gaurding, rebound tenderness, no ventral or inguinal hernias present  o Liver and spleen  o : no hepatomegaly present, liver nontender to palpation, spleen not palpable  · Musculoskeletal  o General  o :   § General Musculoskeletal  § : No joint swelling or deformity., Muscle tone, strength, and development grossly normal.  · Skin and Subcutaneous Tissue  o General Inspection  o : no rashes , or lesions present, normal skin color, warm and dry  o Digits and Nails  o : no clubbing, cyanosis, deformities or edema present, normal appearing nails  · Neurologic  o Mental Status Examination  o : alert and oriented to time, place, and person., Cranial Nerves: grossly intact, Motor Exam: no focal motor deficits, normal bulk tone and power, No abnormal movements., Reflexes: DTR +2, bilateral upper and lower extremities, Sensation: no focal sensory deficits, Gait and Station: normal gait, able to stand without difficulty  · Psychiatric  o Mood and Affect  o : normal mood and affect          Assessment  · Essential hypertension     401.9/I10  Chronic, controlled  · RLS (restless legs syndrome)     333.94/G25.81  Controlled on gabapentin  · Back pain     724.5/M54.9  Controlled on Flexeril  and baclofen  · HLD (hyperlipidemia)     272.4/E78.5  · Elevated triglycerides with high cholesterol     272.2/E78.2  --Continue with fenofibrate's, patient cannot tolerate statin    Triglycerides normal but will continue fenofibrate for few more months      Plan  · Orders  o ACO-14: Influenza immunization was not administered for reasons documented [facility] () - - 02/26/2021  o ACO-39: Current medications updated and reviewed (, 1159F) - - 02/26/2021  · Medications  o Medications have been Reconciled  o Transition of Care or Provider Policy  · Instructions  o Patient advised to monitor blood pressure (B/P) at home and journal readings. Patient informed that a B/P reading at home of more than 130/80 is considered hypertension. For readings greater jkfh601/90 or higher patient is advised to follow up in the office with readings for management. Patient advised to limit sodium intake.  o Patient was educated/instructed on their diagnosis, treatment and medications prior to discharge from the clinic today.  o Patient instructed to seek medical attention urgently for new or worsening symptoms.  o Call the office with any concerns or questions.  o Minutes spent with patient including greater than 50% in Education/Counseling/Care Coordination.  o Time spent with the patient was minutes, more than 50% face to face.  o Discussed Covid-19 precautions including, but not limited to, social distancing, avoid touching your face, and hand washing.   · Disposition  o Call or Return if symptoms worsen or persist.  o Follow Up PRN.  o Follow Up 6 months.            Electronically Signed by: Desiree Dominguez MD -Author on February 26, 2021 12:18:33 PM

## 2021-05-15 VITALS
TEMPERATURE: 97.4 F | OXYGEN SATURATION: 98 % | BODY MASS INDEX: 29.95 KG/M2 | SYSTOLIC BLOOD PRESSURE: 140 MMHG | RESPIRATION RATE: 18 BRPM | WEIGHT: 175.44 LBS | DIASTOLIC BLOOD PRESSURE: 75 MMHG | HEART RATE: 66 BPM | HEIGHT: 64 IN

## 2021-05-15 VITALS
DIASTOLIC BLOOD PRESSURE: 92 MMHG | WEIGHT: 170 LBS | OXYGEN SATURATION: 98 % | SYSTOLIC BLOOD PRESSURE: 141 MMHG | HEIGHT: 64 IN | TEMPERATURE: 97 F | BODY MASS INDEX: 29.02 KG/M2 | HEART RATE: 74 BPM

## 2021-05-15 VITALS
WEIGHT: 174.56 LBS | BODY MASS INDEX: 29.8 KG/M2 | HEIGHT: 64 IN | HEART RATE: 77 BPM | TEMPERATURE: 97.2 F | RESPIRATION RATE: 18 BRPM | SYSTOLIC BLOOD PRESSURE: 171 MMHG | OXYGEN SATURATION: 98 % | DIASTOLIC BLOOD PRESSURE: 80 MMHG

## 2021-05-15 VITALS
OXYGEN SATURATION: 98 % | HEIGHT: 64 IN | HEART RATE: 61 BPM | SYSTOLIC BLOOD PRESSURE: 149 MMHG | DIASTOLIC BLOOD PRESSURE: 80 MMHG | RESPIRATION RATE: 25 BRPM | TEMPERATURE: 96.5 F | BODY MASS INDEX: 29.71 KG/M2 | WEIGHT: 174 LBS

## 2021-05-15 VITALS
HEIGHT: 64 IN | WEIGHT: 173.44 LBS | SYSTOLIC BLOOD PRESSURE: 147 MMHG | DIASTOLIC BLOOD PRESSURE: 76 MMHG | OXYGEN SATURATION: 97 % | BODY MASS INDEX: 29.61 KG/M2 | TEMPERATURE: 96.1 F | HEART RATE: 67 BPM

## 2021-05-15 VITALS
DIASTOLIC BLOOD PRESSURE: 81 MMHG | RESPIRATION RATE: 21 BRPM | WEIGHT: 171.31 LBS | HEART RATE: 67 BPM | OXYGEN SATURATION: 98 % | BODY MASS INDEX: 29.24 KG/M2 | TEMPERATURE: 97.2 F | SYSTOLIC BLOOD PRESSURE: 144 MMHG | HEIGHT: 64 IN

## 2021-05-16 VITALS
BODY MASS INDEX: 29.19 KG/M2 | HEIGHT: 64 IN | WEIGHT: 171 LBS | SYSTOLIC BLOOD PRESSURE: 153 MMHG | OXYGEN SATURATION: 99 % | HEART RATE: 65 BPM | DIASTOLIC BLOOD PRESSURE: 80 MMHG | TEMPERATURE: 98.4 F

## 2021-06-11 RX ORDER — FENOFIBRATE 145 MG/1
TABLET, COATED ORAL
Qty: 30 TABLET | Refills: 2 | Status: SHIPPED | OUTPATIENT
Start: 2021-06-11 | End: 2021-07-15

## 2021-06-25 RX ORDER — CYCLOBENZAPRINE HCL 10 MG
TABLET ORAL
Qty: 60 TABLET | Refills: 1 | Status: SHIPPED | OUTPATIENT
Start: 2021-06-25 | End: 2021-09-07 | Stop reason: SDUPTHER

## 2021-06-25 RX ORDER — PRAMIPEXOLE DIHYDROCHLORIDE 0.12 MG/1
TABLET ORAL
Qty: 60 TABLET | Refills: 11 | Status: SHIPPED | OUTPATIENT
Start: 2021-06-25 | End: 2022-08-08

## 2021-07-15 RX ORDER — FENOFIBRATE 145 MG/1
TABLET, COATED ORAL
Qty: 30 TABLET | Refills: 2 | Status: SHIPPED | OUTPATIENT
Start: 2021-07-15 | End: 2021-12-23

## 2021-09-07 ENCOUNTER — OFFICE VISIT (OUTPATIENT)
Dept: FAMILY MEDICINE CLINIC | Facility: CLINIC | Age: 56
End: 2021-09-07

## 2021-09-07 VITALS
OXYGEN SATURATION: 97 % | HEIGHT: 64 IN | HEART RATE: 94 BPM | BODY MASS INDEX: 28.68 KG/M2 | TEMPERATURE: 98.4 F | SYSTOLIC BLOOD PRESSURE: 132 MMHG | WEIGHT: 168 LBS | DIASTOLIC BLOOD PRESSURE: 84 MMHG

## 2021-09-07 DIAGNOSIS — Z00.00 WELL ADULT EXAM: Primary | ICD-10-CM

## 2021-09-07 DIAGNOSIS — J02.9 PHARYNGITIS, UNSPECIFIED ETIOLOGY: ICD-10-CM

## 2021-09-07 DIAGNOSIS — J30.9 ALLERGIC RHINITIS, UNSPECIFIED SEASONALITY, UNSPECIFIED TRIGGER: ICD-10-CM

## 2021-09-07 LAB
EXPIRATION DATE: NORMAL
INTERNAL CONTROL: NORMAL
Lab: NORMAL
S PYO AG THROAT QL: NEGATIVE

## 2021-09-07 PROCEDURE — 87880 STREP A ASSAY W/OPTIC: CPT | Performed by: NURSE PRACTITIONER

## 2021-09-07 PROCEDURE — 99396 PREV VISIT EST AGE 40-64: CPT | Performed by: NURSE PRACTITIONER

## 2021-09-07 RX ORDER — LISINOPRIL 20 MG/1
TABLET ORAL
COMMUNITY
Start: 2021-04-28 | End: 2022-03-07 | Stop reason: SDUPTHER

## 2021-09-07 RX ORDER — MONTELUKAST SODIUM 10 MG/1
10 TABLET ORAL NIGHTLY
Qty: 90 TABLET | Refills: 1 | Status: SHIPPED | OUTPATIENT
Start: 2021-09-07 | End: 2022-02-09

## 2021-09-07 RX ORDER — CYCLOBENZAPRINE HCL 10 MG
TABLET ORAL
COMMUNITY
Start: 2021-02-04 | End: 2021-11-19

## 2021-09-07 RX ORDER — DICLOFENAC SODIUM 75 MG/1
TABLET, DELAYED RELEASE ORAL
COMMUNITY
Start: 2021-04-28 | End: 2022-03-07 | Stop reason: SDUPTHER

## 2021-09-07 NOTE — PROGRESS NOTES
"Chief Complaint  Annual Exam    Subjective          Barb Clinton presents to [facility]  Presents to the office today for an annual wellness exam.  Forms were filled out for her employer.  Blood pressure on arrival today is 132/84.  She denies any chest pain shortness breath palpitations this time.  Patient does state that she was recently exposed to strep and woke up with sharp throat pain.  She denies any fevers, headaches or changes in her appetite.  She does state that she noticed postnasal drip all night last night and is unsure if this is what is causing her sore throat      Objective   Vital Signs:   /84   Pulse 94   Temp 98.4 °F (36.9 °C)   Ht 162.6 cm (64\")   Wt 76.2 kg (168 lb)   SpO2 97%   BMI 28.84 kg/m²     Physical Exam  Vitals reviewed.   Constitutional:       Appearance: Normal appearance.   HENT:      Head: Normocephalic and atraumatic.      Right Ear: Tympanic membrane, ear canal and external ear normal.      Left Ear: Tympanic membrane, ear canal and external ear normal.      Nose: Nose normal.      Mouth/Throat:      Mouth: Mucous membranes are moist.      Pharynx: Oropharynx is clear.      Comments: Cobblestoning noted  Eyes:      Extraocular Movements: Extraocular movements intact.      Conjunctiva/sclera: Conjunctivae normal.      Pupils: Pupils are equal, round, and reactive to light.   Cardiovascular:      Rate and Rhythm: Normal rate and regular rhythm.      Pulses: Normal pulses.      Heart sounds: Normal heart sounds, S1 normal and S2 normal. No murmur heard.     Pulmonary:      Effort: Pulmonary effort is normal. No respiratory distress.      Breath sounds: Normal breath sounds.   Abdominal:      General: Abdomen is flat.      Palpations: Abdomen is soft.   Musculoskeletal:         General: Normal range of motion.      Cervical back: Normal range of motion and neck supple.   Skin:     General: Skin is warm and dry.   Neurological:      " Mental Status: She is alert and oriented to person, place, and time.   Psychiatric:         Attention and Perception: Attention normal.         Mood and Affect: Mood normal.         Behavior: Behavior normal.        Result Review :                Assessment and Plan    Diagnoses and all orders for this visit:    1. Well adult exam (Primary)    2. Allergic rhinitis, unspecified seasonality, unspecified trigger  -     POCT rapid strep A  -     montelukast (Singulair) 10 MG tablet; Take 1 tablet by mouth Every Night.  Dispense: 90 tablet; Refill: 1    3. Pharyngitis, unspecified etiology  -     POCT rapid strep A  -     montelukast (Singulair) 10 MG tablet; Take 1 tablet by mouth Every Night.  Dispense: 90 tablet; Refill: 1    Preventative counseling includes healthy diet and exercise.  Also discussed routine mammograms for breast cancer screenings as well as colonoscopy for colon cancer screenings.    Follow Up   Return in about 6 months (around 3/7/2022).  Patient was given instructions and counseling regarding her condition or for health maintenance advice. Please see specific information pulled into the AVS if appropriate.

## 2021-09-09 ENCOUNTER — TELEPHONE (OUTPATIENT)
Dept: FAMILY MEDICINE CLINIC | Facility: CLINIC | Age: 56
End: 2021-09-09

## 2021-09-09 NOTE — TELEPHONE ENCOUNTER
Caller: Barb Clinton    Relationship: Self    Best call back number: [phone]    What medication are you requesting: MEDICATION FOR SORE THROAT    What are your current symptoms: EXTREMELY SORE THROAT THAT AFFECTS HER SPEECH AND SWALLOWING.     Have you had these symptoms before:    [x] Yes  [] No    Have you been treated for these symptoms before:   [x] Yes  [] No    If a prescription is needed, what is your preferred pharmacy and phone number:    [address] [phone] [facility] [phone]      Additional notes: PATIENT WAS IN TO SEE MICHEALA VILLALPANDO ON Tuesday 09.07.2021 AND WAS GIVEN montelukast (Singulair) 10 MG   HOWEVER PATIENT FEELS LIKE THIS MEDICATION IS NOT WORKING AND HER SORE THROAT HAS GOTTEN WORSE. PATIENT WOULD LIKE A NEW PRESCRIPTION TO HELP WITH HER THROAT.

## 2021-09-14 RX ORDER — AZITHROMYCIN 250 MG/1
TABLET, FILM COATED ORAL
Qty: 6 TABLET | Refills: 0 | Status: SHIPPED | OUTPATIENT
Start: 2021-09-14 | End: 2022-03-07

## 2021-11-19 RX ORDER — CYCLOBENZAPRINE HCL 10 MG
TABLET ORAL
Qty: 60 TABLET | Refills: 0 | Status: SHIPPED | OUTPATIENT
Start: 2021-11-19 | End: 2022-01-24

## 2021-11-19 NOTE — TELEPHONE ENCOUNTER
Please advise medication.   Last visit: 9/7/2021  Next visit: 3/7/2022  Last labs: 2/17/2021    cyclobenzaprine (FLEXERIL) 10 MG tablet - TAKE 1 TABLET BY MOUTH AT  BEDTIME for 60 days

## 2021-12-03 ENCOUNTER — TELEPHONE (OUTPATIENT)
Dept: FAMILY MEDICINE CLINIC | Facility: CLINIC | Age: 56
End: 2021-12-03

## 2021-12-03 DIAGNOSIS — E78.5 HYPERLIPIDEMIA, UNSPECIFIED HYPERLIPIDEMIA TYPE: Primary | ICD-10-CM

## 2021-12-03 RX ORDER — ATORVASTATIN CALCIUM 20 MG/1
20 TABLET, FILM COATED ORAL DAILY
Qty: 90 TABLET | Refills: 1 | Status: SHIPPED | OUTPATIENT
Start: 2021-12-03 | End: 2022-03-07 | Stop reason: SDUPTHER

## 2021-12-03 NOTE — TELEPHONE ENCOUNTER
[facility] is sending a fax requesting Atorvastatin 20mg tab - take 1 tablet by mouth once daily at bedtime. This medication is not in Epic.     [place] - Atorvastatin 20mg tab was discontinued 12/4/2020.    lvm for patient to call back and discuss.

## 2021-12-23 RX ORDER — FENOFIBRATE 145 MG/1
TABLET, COATED ORAL
Qty: 90 TABLET | Refills: 2 | Status: SHIPPED | OUTPATIENT
Start: 2021-12-23 | End: 2022-03-07 | Stop reason: SDUPTHER

## 2022-01-24 RX ORDER — CYCLOBENZAPRINE HCL 10 MG
TABLET ORAL
Qty: 60 TABLET | Refills: 0 | Status: SHIPPED | OUTPATIENT
Start: 2022-01-24 | End: 2022-03-07 | Stop reason: SDUPTHER

## 2022-02-08 DIAGNOSIS — J02.9 PHARYNGITIS, UNSPECIFIED ETIOLOGY: ICD-10-CM

## 2022-02-08 DIAGNOSIS — J30.9 ALLERGIC RHINITIS, UNSPECIFIED SEASONALITY, UNSPECIFIED TRIGGER: ICD-10-CM

## 2022-02-09 RX ORDER — MONTELUKAST SODIUM 10 MG/1
10 TABLET ORAL NIGHTLY
Qty: 90 TABLET | Refills: 1 | Status: SHIPPED | OUTPATIENT
Start: 2022-02-09 | End: 2022-03-07 | Stop reason: SDUPTHER

## 2022-03-04 PROBLEM — E78.00 HYPERCHOLESTEROLEMIA: Status: ACTIVE | Noted: 2022-03-04

## 2022-03-07 ENCOUNTER — OFFICE VISIT (OUTPATIENT)
Dept: FAMILY MEDICINE CLINIC | Facility: CLINIC | Age: 57
End: 2022-03-07

## 2022-03-07 ENCOUNTER — TELEPHONE (OUTPATIENT)
Dept: FAMILY MEDICINE CLINIC | Facility: CLINIC | Age: 57
End: 2022-03-07

## 2022-03-07 ENCOUNTER — HOSPITAL ENCOUNTER (OUTPATIENT)
Dept: GENERAL RADIOLOGY | Facility: HOSPITAL | Age: 57
Discharge: HOME | End: 2022-03-07
Admitting: NURSE PRACTITIONER

## 2022-03-07 VITALS
HEIGHT: 65 IN | SYSTOLIC BLOOD PRESSURE: 126 MMHG | OXYGEN SATURATION: 97 % | TEMPERATURE: 97.7 F | BODY MASS INDEX: 29.85 KG/M2 | WEIGHT: 179.2 LBS | DIASTOLIC BLOOD PRESSURE: 70 MMHG | HEART RATE: 119 BPM

## 2022-03-07 DIAGNOSIS — R22.32 NODULE OF FINGER OF LEFT HAND: ICD-10-CM

## 2022-03-07 DIAGNOSIS — M54.2 CERVICAL PAIN (NECK): ICD-10-CM

## 2022-03-07 DIAGNOSIS — I10 HYPERTENSION, UNSPECIFIED TYPE: ICD-10-CM

## 2022-03-07 DIAGNOSIS — J30.9 ALLERGIC RHINITIS, UNSPECIFIED SEASONALITY, UNSPECIFIED TRIGGER: ICD-10-CM

## 2022-03-07 DIAGNOSIS — E78.5 HYPERLIPIDEMIA, UNSPECIFIED HYPERLIPIDEMIA TYPE: Primary | ICD-10-CM

## 2022-03-07 PROCEDURE — 99214 OFFICE O/P EST MOD 30 MIN: CPT | Performed by: NURSE PRACTITIONER

## 2022-03-07 PROCEDURE — 73130 X-RAY EXAM OF HAND: CPT

## 2022-03-07 RX ORDER — MONTELUKAST SODIUM 10 MG/1
10 TABLET ORAL NIGHTLY
Qty: 90 TABLET | Refills: 1 | Status: SHIPPED | OUTPATIENT
Start: 2022-03-07 | End: 2022-09-07 | Stop reason: SDUPTHER

## 2022-03-07 RX ORDER — CYCLOBENZAPRINE HCL 10 MG
10 TABLET ORAL
Qty: 90 TABLET | Refills: 1 | Status: SHIPPED | OUTPATIENT
Start: 2022-03-07 | End: 2022-10-19

## 2022-03-07 RX ORDER — ATORVASTATIN CALCIUM 20 MG/1
20 TABLET, FILM COATED ORAL DAILY
Qty: 90 TABLET | Refills: 1 | Status: SHIPPED | OUTPATIENT
Start: 2022-03-07 | End: 2022-03-21

## 2022-03-07 RX ORDER — FENOFIBRATE 145 MG/1
145 TABLET, COATED ORAL DAILY
Qty: 90 TABLET | Refills: 2 | Status: SHIPPED | OUTPATIENT
Start: 2022-03-07 | End: 2022-09-07 | Stop reason: SDUPTHER

## 2022-03-07 RX ORDER — ALBUTEROL SULFATE 90 UG/1
AEROSOL, METERED RESPIRATORY (INHALATION)
COMMUNITY
Start: 2022-01-31

## 2022-03-07 RX ORDER — LISINOPRIL 20 MG/1
20 TABLET ORAL DAILY
Qty: 90 TABLET | Refills: 1 | Status: SHIPPED | OUTPATIENT
Start: 2022-03-07 | End: 2022-03-21 | Stop reason: ALTCHOICE

## 2022-03-07 RX ORDER — DICLOFENAC SODIUM 75 MG/1
75 TABLET, DELAYED RELEASE ORAL 2 TIMES DAILY
Qty: 180 TABLET | Refills: 1 | Status: SHIPPED | OUTPATIENT
Start: 2022-03-07 | End: 2022-03-21 | Stop reason: ALTCHOICE

## 2022-03-07 NOTE — TELEPHONE ENCOUNTER
----- Message from MICHAELA Newby sent at 3/7/2022 12:47 PM EST -----  X-ray of the hand is negative.  Are some degenerative changes noted which are associated with arthritis

## 2022-03-07 NOTE — PROGRESS NOTES
"Chief Complaint  Hypertension (6 month follow up)    Subjective          Barb Clinton presents to [facility]  Patient presents to the office today for 6-month follow-up.  Patient has not had labs since 2020.  I did explain that we would get these updated.  She does state that she is needing refills on all her medications.  Does state that she takes the diclofenac and cyclobenzaprine for neck pain as well as spasms.  Does state that she had x-rays completed which show degenerative disc disease.  Patient also complains of a nodule to the base of the second digit he states that she has noticed this over the past 3 to 4 weeks and that has actually got bigger.  She denies any erythema or any changes in her range of motion.  She states that the area is tender to touch    Hypertension        Objective   Vital Signs:   /70 (BP Location: Right arm, Patient Position: Sitting, Cuff Size: Adult)   Pulse 119   Temp 97.7 °F (36.5 °C) (Temporal)   Ht 165.1 cm (65\")   Wt 81.3 kg (179 lb 3.2 oz)   SpO2 97%   BMI 29.82 kg/m²     Physical Exam  Vitals reviewed.   Constitutional:       Appearance: Normal appearance.   Cardiovascular:      Rate and Rhythm: Normal rate and regular rhythm.      Heart sounds: Normal heart sounds, S1 normal and S2 normal. No murmur heard.  Pulmonary:      Effort: Pulmonary effort is normal. No respiratory distress.      Breath sounds: Normal breath sounds.   Musculoskeletal:        Arms:       Comments: Nodule noted to base of index finger on left hand, no erythema, edema or drainage.  nonmobile   Skin:     General: Skin is warm and dry.   Neurological:      Mental Status: She is alert and oriented to person, place, and time.   Psychiatric:         Attention and Perception: Attention normal.         Mood and Affect: Mood normal.         Behavior: Behavior normal.        Result Review :              Assessment and Plan    Diagnoses and all orders for this " visit:    1. Hyperlipidemia, unspecified hyperlipidemia type (Primary)  -     Lipid Panel; Future  -     atorvastatin (LIPITOR) 20 MG tablet; Take 1 tablet by mouth Daily.  Dispense: 90 tablet; Refill: 1  -     fenofibrate (TRICOR) 145 MG tablet; Take 1 tablet by mouth Daily.  Dispense: 90 tablet; Refill: 2    2. Allergic rhinitis, unspecified seasonality, unspecified trigger  -     montelukast (SINGULAIR) 10 MG tablet; Take 1 tablet by mouth Every Night.  Dispense: 90 tablet; Refill: 1    3. Hypertension, unspecified type  -     CBC & Differential; Future  -     Comprehensive Metabolic Panel; Future  -     lisinopril (PRINIVIL,ZESTRIL) 20 MG tablet; Take 1 tablet by mouth Daily.  Dispense: 90 tablet; Refill: 1    4. Nodule of finger of left hand  -     XR Hand 3+ View Left; Future    5. Cervical pain (neck)  -     cyclobenzaprine (FLEXERIL) 10 MG tablet; Take 1 tablet by mouth every night at bedtime.  Dispense: 90 tablet; Refill: 1  -     diclofenac (VOLTAREN) 75 MG EC tablet; Take 1 tablet by mouth 2 (Two) Times a Day.  Dispense: 180 tablet; Refill: 1        Follow Up   Return in about 6 months (around 9/7/2022) for Recheck.  Patient was given instructions and counseling regarding her condition or for health maintenance advice. Please see specific information pulled into the AVS if appropriate.

## 2022-03-18 ENCOUNTER — LAB (OUTPATIENT)
Dept: LAB | Facility: HOSPITAL | Age: 57
End: 2022-03-18

## 2022-03-18 DIAGNOSIS — I10 HYPERTENSION, UNSPECIFIED TYPE: ICD-10-CM

## 2022-03-18 DIAGNOSIS — E78.5 HYPERLIPIDEMIA, UNSPECIFIED HYPERLIPIDEMIA TYPE: ICD-10-CM

## 2022-03-18 LAB
ALBUMIN SERPL-MCNC: 4.6 G/DL (ref 3.5–5.2)
ALBUMIN/GLOB SERPL: 1.8 G/DL
ALP SERPL-CCNC: 59 U/L (ref 39–117)
ALT SERPL W P-5'-P-CCNC: 52 U/L (ref 1–33)
ANION GAP SERPL CALCULATED.3IONS-SCNC: 7.4 MMOL/L (ref 5–15)
AST SERPL-CCNC: 47 U/L (ref 1–32)
BASOPHILS # BLD AUTO: 0.03 10*3/MM3 (ref 0–0.2)
BASOPHILS NFR BLD AUTO: 0.6 % (ref 0–1.5)
BILIRUB SERPL-MCNC: 0.3 MG/DL (ref 0–1.2)
BUN SERPL-MCNC: 24 MG/DL (ref 6–20)
BUN/CREAT SERPL: 17.6 (ref 7–25)
CALCIUM SPEC-SCNC: 9.6 MG/DL (ref 8.6–10.5)
CHLORIDE SERPL-SCNC: 105 MMOL/L (ref 98–107)
CHOLEST SERPL-MCNC: 287 MG/DL (ref 0–200)
CO2 SERPL-SCNC: 24.6 MMOL/L (ref 22–29)
CREAT SERPL-MCNC: 1.36 MG/DL (ref 0.57–1)
DEPRECATED RDW RBC AUTO: 39 FL (ref 37–54)
EGFRCR SERPLBLD CKD-EPI 2021: 45.8 ML/MIN/1.73
EOSINOPHIL # BLD AUTO: 0.24 10*3/MM3 (ref 0–0.4)
EOSINOPHIL NFR BLD AUTO: 4.7 % (ref 0.3–6.2)
ERYTHROCYTE [DISTWIDTH] IN BLOOD BY AUTOMATED COUNT: 12.3 % (ref 12.3–15.4)
GLOBULIN UR ELPH-MCNC: 2.6 GM/DL
GLUCOSE SERPL-MCNC: 93 MG/DL (ref 65–99)
HCT VFR BLD AUTO: 34.6 % (ref 34–46.6)
HDLC SERPL-MCNC: 56 MG/DL (ref 40–60)
HGB BLD-MCNC: 11.2 G/DL (ref 12–15.9)
IMM GRANULOCYTES # BLD AUTO: 0.02 10*3/MM3 (ref 0–0.05)
IMM GRANULOCYTES NFR BLD AUTO: 0.4 % (ref 0–0.5)
LDLC SERPL CALC-MCNC: 206 MG/DL (ref 0–100)
LDLC/HDLC SERPL: 3.64 {RATIO}
LYMPHOCYTES # BLD AUTO: 1.97 10*3/MM3 (ref 0.7–3.1)
LYMPHOCYTES NFR BLD AUTO: 38.2 % (ref 19.6–45.3)
MCH RBC QN AUTO: 28.1 PG (ref 26.6–33)
MCHC RBC AUTO-ENTMCNC: 32.4 G/DL (ref 31.5–35.7)
MCV RBC AUTO: 86.7 FL (ref 79–97)
MONOCYTES # BLD AUTO: 0.49 10*3/MM3 (ref 0.1–0.9)
MONOCYTES NFR BLD AUTO: 9.5 % (ref 5–12)
NEUTROPHILS NFR BLD AUTO: 2.41 10*3/MM3 (ref 1.7–7)
NEUTROPHILS NFR BLD AUTO: 46.6 % (ref 42.7–76)
NRBC BLD AUTO-RTO: 0 /100 WBC (ref 0–0.2)
PLATELET # BLD AUTO: 297 10*3/MM3 (ref 140–450)
PMV BLD AUTO: 10.9 FL (ref 6–12)
POTASSIUM SERPL-SCNC: 4.7 MMOL/L (ref 3.5–5.2)
PROT SERPL-MCNC: 7.2 G/DL (ref 6–8.5)
RBC # BLD AUTO: 3.99 10*6/MM3 (ref 3.77–5.28)
SODIUM SERPL-SCNC: 137 MMOL/L (ref 136–145)
TRIGL SERPL-MCNC: 136 MG/DL (ref 0–150)
VLDLC SERPL-MCNC: 25 MG/DL (ref 5–40)
WBC NRBC COR # BLD: 5.16 10*3/MM3 (ref 3.4–10.8)

## 2022-03-18 PROCEDURE — 80053 COMPREHEN METABOLIC PANEL: CPT

## 2022-03-18 PROCEDURE — 36415 COLL VENOUS BLD VENIPUNCTURE: CPT

## 2022-03-18 PROCEDURE — 80061 LIPID PANEL: CPT

## 2022-03-18 PROCEDURE — 85025 COMPLETE CBC W/AUTO DIFF WBC: CPT

## 2022-03-21 ENCOUNTER — TELEPHONE (OUTPATIENT)
Dept: FAMILY MEDICINE CLINIC | Facility: CLINIC | Age: 57
End: 2022-03-21

## 2022-03-21 DIAGNOSIS — I10 HYPERTENSION, UNSPECIFIED TYPE: ICD-10-CM

## 2022-03-21 DIAGNOSIS — E78.5 HYPERLIPIDEMIA, UNSPECIFIED HYPERLIPIDEMIA TYPE: ICD-10-CM

## 2022-03-21 DIAGNOSIS — M54.2 CERVICAL PAIN (NECK): ICD-10-CM

## 2022-03-21 RX ORDER — AMLODIPINE BESYLATE 5 MG/1
5 TABLET ORAL DAILY
Qty: 90 TABLET | Refills: 1 | Status: SHIPPED | OUTPATIENT
Start: 2022-03-21 | End: 2022-06-21

## 2022-03-21 RX ORDER — ATORVASTATIN CALCIUM 40 MG/1
40 TABLET, FILM COATED ORAL DAILY
Qty: 90 TABLET | Refills: 1 | Status: SHIPPED | OUTPATIENT
Start: 2022-03-21 | End: 2022-09-07 | Stop reason: SDUPTHER

## 2022-03-21 NOTE — TELEPHONE ENCOUNTER
----- Message from MICHAELA Newby sent at 3/21/2022  7:02 AM EDT -----  Cholesterol levels are elevated.  She needs to increase her atorvastatin to 40 mg nightly.  She also needs to discontinue the diclofenac that was sent to her at her recent visit.  Patient may only take Tylenol for her neck pain due to renal insufficiency.  We will also discontinue the lisinopril 20 mg and start amlodipine 5 mg daily

## 2022-06-21 RX ORDER — AMLODIPINE BESYLATE 5 MG/1
TABLET ORAL
Qty: 90 TABLET | Refills: 1 | Status: SHIPPED | OUTPATIENT
Start: 2022-06-21 | End: 2022-09-07 | Stop reason: SDUPTHER

## 2022-08-08 RX ORDER — PRAMIPEXOLE DIHYDROCHLORIDE 0.12 MG/1
TABLET ORAL
Qty: 180 TABLET | Refills: 3 | Status: SHIPPED | OUTPATIENT
Start: 2022-08-08 | End: 2022-08-12 | Stop reason: SDUPTHER

## 2022-08-10 RX ORDER — PRAMIPEXOLE DIHYDROCHLORIDE 0.12 MG/1
TABLET ORAL
Qty: 180 TABLET | Refills: 3 | OUTPATIENT
Start: 2022-08-10

## 2022-08-12 ENCOUNTER — TELEPHONE (OUTPATIENT)
Dept: FAMILY MEDICINE CLINIC | Facility: CLINIC | Age: 57
End: 2022-08-12

## 2022-08-12 RX ORDER — PRAMIPEXOLE DIHYDROCHLORIDE 0.12 MG/1
0.12 TABLET ORAL NIGHTLY
Qty: 180 TABLET | Refills: 3 | Status: SHIPPED | OUTPATIENT
Start: 2022-08-12 | End: 2022-09-07

## 2022-09-07 ENCOUNTER — OFFICE VISIT (OUTPATIENT)
Dept: FAMILY MEDICINE CLINIC | Facility: CLINIC | Age: 57
End: 2022-09-07

## 2022-09-07 ENCOUNTER — LAB (OUTPATIENT)
Dept: LAB | Facility: HOSPITAL | Age: 57
End: 2022-09-07

## 2022-09-07 VITALS
SYSTOLIC BLOOD PRESSURE: 130 MMHG | HEART RATE: 94 BPM | BODY MASS INDEX: 28.86 KG/M2 | OXYGEN SATURATION: 99 % | DIASTOLIC BLOOD PRESSURE: 72 MMHG | WEIGHT: 173.2 LBS | TEMPERATURE: 97.3 F | HEIGHT: 65 IN

## 2022-09-07 DIAGNOSIS — E78.5 HYPERLIPIDEMIA, UNSPECIFIED HYPERLIPIDEMIA TYPE: ICD-10-CM

## 2022-09-07 DIAGNOSIS — Z00.00 WELL ADULT EXAM: ICD-10-CM

## 2022-09-07 DIAGNOSIS — N28.9 RENAL INSUFFICIENCY: ICD-10-CM

## 2022-09-07 DIAGNOSIS — I10 PRIMARY HYPERTENSION: ICD-10-CM

## 2022-09-07 DIAGNOSIS — E78.5 HYPERLIPIDEMIA, UNSPECIFIED HYPERLIPIDEMIA TYPE: Primary | ICD-10-CM

## 2022-09-07 DIAGNOSIS — J30.9 ALLERGIC RHINITIS, UNSPECIFIED SEASONALITY, UNSPECIFIED TRIGGER: ICD-10-CM

## 2022-09-07 DIAGNOSIS — M54.2 CERVICAL PAIN (NECK): ICD-10-CM

## 2022-09-07 DIAGNOSIS — G25.81 RESTLESS LEGS: ICD-10-CM

## 2022-09-07 LAB
ALBUMIN SERPL-MCNC: 4.2 G/DL (ref 3.5–5.2)
ALBUMIN/GLOB SERPL: 1.4 G/DL
ALP SERPL-CCNC: 147 U/L (ref 39–117)
ALT SERPL W P-5'-P-CCNC: 154 U/L (ref 1–33)
ANION GAP SERPL CALCULATED.3IONS-SCNC: 11.1 MMOL/L (ref 5–15)
AST SERPL-CCNC: 93 U/L (ref 1–32)
BILIRUB SERPL-MCNC: 0.3 MG/DL (ref 0–1.2)
BUN SERPL-MCNC: 10 MG/DL (ref 6–20)
BUN/CREAT SERPL: 12.7 (ref 7–25)
CALCIUM SPEC-SCNC: 10.1 MG/DL (ref 8.6–10.5)
CHLORIDE SERPL-SCNC: 99 MMOL/L (ref 98–107)
CHOLEST SERPL-MCNC: 343 MG/DL (ref 0–200)
CO2 SERPL-SCNC: 27.9 MMOL/L (ref 22–29)
CREAT SERPL-MCNC: 0.79 MG/DL (ref 0.57–1)
EGFRCR SERPLBLD CKD-EPI 2021: 87.4 ML/MIN/1.73
GLOBULIN UR ELPH-MCNC: 2.9 GM/DL
GLUCOSE SERPL-MCNC: 102 MG/DL (ref 65–99)
HDLC SERPL-MCNC: 53 MG/DL (ref 40–60)
LDLC SERPL CALC-MCNC: 238 MG/DL (ref 0–100)
LDLC/HDLC SERPL: 4.51 {RATIO}
POTASSIUM SERPL-SCNC: 4.1 MMOL/L (ref 3.5–5.2)
PROT SERPL-MCNC: 7.1 G/DL (ref 6–8.5)
SODIUM SERPL-SCNC: 138 MMOL/L (ref 136–145)
TRIGL SERPL-MCNC: 254 MG/DL (ref 0–150)
VLDLC SERPL-MCNC: 52 MG/DL (ref 5–40)

## 2022-09-07 PROCEDURE — 80053 COMPREHEN METABOLIC PANEL: CPT

## 2022-09-07 PROCEDURE — 36415 COLL VENOUS BLD VENIPUNCTURE: CPT

## 2022-09-07 PROCEDURE — 99214 OFFICE O/P EST MOD 30 MIN: CPT | Performed by: NURSE PRACTITIONER

## 2022-09-07 PROCEDURE — 99396 PREV VISIT EST AGE 40-64: CPT | Performed by: NURSE PRACTITIONER

## 2022-09-07 PROCEDURE — 80061 LIPID PANEL: CPT

## 2022-09-07 RX ORDER — FENOFIBRATE 145 MG/1
145 TABLET, COATED ORAL DAILY
Qty: 90 TABLET | Refills: 2 | Status: SHIPPED | OUTPATIENT
Start: 2022-09-07 | End: 2023-03-16

## 2022-09-07 RX ORDER — DICLOFENAC SODIUM 75 MG/1
TABLET, DELAYED RELEASE ORAL
COMMUNITY
Start: 2022-08-05 | End: 2022-09-07

## 2022-09-07 RX ORDER — AMLODIPINE BESYLATE 5 MG/1
5 TABLET ORAL DAILY
Qty: 90 TABLET | Refills: 1 | Status: SHIPPED | OUTPATIENT
Start: 2022-09-07 | End: 2022-12-07 | Stop reason: SDUPTHER

## 2022-09-07 RX ORDER — MONTELUKAST SODIUM 10 MG/1
10 TABLET ORAL NIGHTLY
Qty: 90 TABLET | Refills: 1 | Status: SHIPPED | OUTPATIENT
Start: 2022-09-07 | End: 2022-10-11

## 2022-09-07 RX ORDER — PRAMIPEXOLE DIHYDROCHLORIDE 0.12 MG/1
0.12 TABLET ORAL 2 TIMES DAILY
Qty: 180 TABLET | Refills: 1 | Status: SHIPPED | OUTPATIENT
Start: 2022-09-07 | End: 2022-12-07

## 2022-09-07 RX ORDER — ATORVASTATIN CALCIUM 40 MG/1
40 TABLET, FILM COATED ORAL DAILY
Qty: 90 TABLET | Refills: 1 | Status: SHIPPED | OUTPATIENT
Start: 2022-09-07 | End: 2022-12-07 | Stop reason: ALTCHOICE

## 2022-09-07 NOTE — PROGRESS NOTES
"Chief Complaint  Hyperlipidemia (6 month follow up)    Subjective         Barb Clinton presents to [facility]  Patient presents to the office today for a wellness physical required by her employer.  She states that she continues to have multiple joint pains secondary to her arthritis.  Her diclofenac had to be discontinued during her last visit secondary to the renal insufficiency.  I explained that we were going to recheck her kidney function.  Patient states that she needs something as her arthritis is very painful.  I explained to the patient that if her kidney function was still decreased that we would start duloxetine.  I stated that I would also refer her to nephrology as she has a family history of kidney disease.  Does state that she has refills on all of her medications.  Blood pressure arrival today is 130/72.  She denies any chest pain shortness of breath palpitations at this time.       Objective     Vitals:    09/07/22 0849   BP: 130/72   BP Location: Right arm   Patient Position: Sitting   Cuff Size: Adult   Pulse: 94   Temp: 97.3 °F (36.3 °C)   TempSrc: Temporal   SpO2: 99%   Weight: 78.6 kg (173 lb 3.2 oz)   Height: 165.1 cm (65\")      Body mass index is 28.82 kg/m².    BMI is >= 25 and <30. (Overweight) The following options were offered after discussion;: nutrition counseling/recommendations        Physical Exam  Vitals reviewed.   Constitutional:       Appearance: Normal appearance.   HENT:      Head: Normocephalic and atraumatic.      Right Ear: Tympanic membrane, ear canal and external ear normal.      Left Ear: Tympanic membrane, ear canal and external ear normal.      Nose: Nose normal.      Mouth/Throat:      Mouth: Mucous membranes are moist.      Pharynx: Oropharynx is clear.   Eyes:      Extraocular Movements: Extraocular movements intact.      Conjunctiva/sclera: Conjunctivae normal.      Pupils: Pupils are equal, round, and reactive to light. "   Cardiovascular:      Rate and Rhythm: Normal rate and regular rhythm.      Pulses: Normal pulses.      Heart sounds: Normal heart sounds, S1 normal and S2 normal. No murmur heard.  Pulmonary:      Effort: Pulmonary effort is normal. No respiratory distress.      Breath sounds: Normal breath sounds.   Abdominal:      General: Abdomen is flat. Bowel sounds are normal.      Palpations: Abdomen is soft.   Musculoskeletal:         General: Normal range of motion.      Cervical back: Normal range of motion and neck supple.   Skin:     General: Skin is warm and dry.   Neurological:      General: No focal deficit present.      Mental Status: She is alert and oriented to person, place, and time.   Psychiatric:         Attention and Perception: Attention normal.         Mood and Affect: Mood normal.         Behavior: Behavior normal.          Result Review :   The following data was reviewed by: MICHAELA Newby on 09/07/2022:      Procedures    Assessment and Plan   Diagnoses and all orders for this visit:    1. Hyperlipidemia, unspecified hyperlipidemia type (Primary)  -     Lipid Panel; Future  -     atorvastatin (LIPITOR) 40 MG tablet; Take 1 tablet by mouth Daily.  Dispense: 90 tablet; Refill: 1  -     fenofibrate (TRICOR) 145 MG tablet; Take 1 tablet by mouth Daily.  Dispense: 90 tablet; Refill: 2    2. Renal insufficiency  -     Comprehensive Metabolic Panel; Future    3. Allergic rhinitis, unspecified seasonality, unspecified trigger  -     montelukast (SINGULAIR) 10 MG tablet; Take 1 tablet by mouth Every Night.  Dispense: 90 tablet; Refill: 1    4. Cervical pain (neck)    5. Primary hypertension  -     amLODIPine (NORVASC) 5 MG tablet; Take 1 tablet by mouth Daily.  Dispense: 90 tablet; Refill: 1    6. Restless legs  -     pramipexole (MIRAPEX) 0.125 MG tablet; Take 1 tablet by mouth 2 (Two) Times a Day.  Dispense: 180 tablet; Refill: 1    7. Well adult exam    Preventative counseling includes healthy diet and  exercise.  Does have her mammogram and Pap smear done annually in [place].  Patient is getting contact information so we can acquire these records    Follow Up   Return in about 6 months (around 3/7/2023) for Recheck.  Patient was given instructions and counseling regarding her condition or for health maintenance advice. Please see specific information pulled into the AVS if appropriate.

## 2022-09-08 DIAGNOSIS — R74.8 ELEVATED LIVER ENZYMES: Primary | ICD-10-CM

## 2022-09-08 DIAGNOSIS — M19.90 OSTEOARTHRITIS, UNSPECIFIED OSTEOARTHRITIS TYPE, UNSPECIFIED SITE: ICD-10-CM

## 2022-09-08 RX ORDER — MELOXICAM 15 MG/1
15 TABLET ORAL DAILY
Qty: 90 TABLET | Refills: 1 | Status: SHIPPED | OUTPATIENT
Start: 2022-09-08 | End: 2022-12-02 | Stop reason: SDUPTHER

## 2022-09-29 ENCOUNTER — HOSPITAL ENCOUNTER (OUTPATIENT)
Dept: ULTRASOUND IMAGING | Facility: HOSPITAL | Age: 57
Discharge: HOME | End: 2022-09-29
Admitting: NURSE PRACTITIONER

## 2022-09-29 DIAGNOSIS — R74.8 ELEVATED LIVER ENZYMES: ICD-10-CM

## 2022-09-29 PROCEDURE — 76705 ECHO EXAM OF ABDOMEN: CPT

## 2022-10-11 DIAGNOSIS — J30.9 ALLERGIC RHINITIS, UNSPECIFIED SEASONALITY, UNSPECIFIED TRIGGER: ICD-10-CM

## 2022-10-11 DIAGNOSIS — M54.2 CERVICAL PAIN (NECK): ICD-10-CM

## 2022-10-11 RX ORDER — DICLOFENAC SODIUM 75 MG/1
TABLET, DELAYED RELEASE ORAL
Qty: 180 TABLET | Refills: 3 | OUTPATIENT
Start: 2022-10-11

## 2022-10-11 RX ORDER — MONTELUKAST SODIUM 10 MG/1
TABLET ORAL
Qty: 90 TABLET | Refills: 3 | Status: SHIPPED | OUTPATIENT
Start: 2022-10-11

## 2022-10-18 DIAGNOSIS — M54.2 CERVICAL PAIN (NECK): ICD-10-CM

## 2022-10-19 RX ORDER — CYCLOBENZAPRINE HCL 10 MG
10 TABLET ORAL
Qty: 90 TABLET | Refills: 1 | Status: SHIPPED | OUTPATIENT
Start: 2022-10-19 | End: 2023-01-03

## 2022-10-19 RX ORDER — DICLOFENAC SODIUM 75 MG/1
TABLET, DELAYED RELEASE ORAL
Qty: 180 TABLET | Refills: 3 | OUTPATIENT
Start: 2022-10-19

## 2022-11-10 DIAGNOSIS — E78.5 HYPERLIPIDEMIA, UNSPECIFIED HYPERLIPIDEMIA TYPE: ICD-10-CM

## 2022-11-10 RX ORDER — ATORVASTATIN CALCIUM 20 MG/1
20 TABLET, FILM COATED ORAL DAILY
Qty: 90 TABLET | Refills: 3 | Status: SHIPPED | OUTPATIENT
Start: 2022-11-10 | End: 2022-12-07 | Stop reason: ALTCHOICE

## 2022-12-02 DIAGNOSIS — M19.90 OSTEOARTHRITIS, UNSPECIFIED OSTEOARTHRITIS TYPE, UNSPECIFIED SITE: ICD-10-CM

## 2022-12-02 RX ORDER — MELOXICAM 15 MG/1
15 TABLET ORAL DAILY
Qty: 90 TABLET | Refills: 1 | Status: SHIPPED | OUTPATIENT
Start: 2022-12-02

## 2022-12-07 ENCOUNTER — OFFICE VISIT (OUTPATIENT)
Dept: FAMILY MEDICINE CLINIC | Facility: CLINIC | Age: 57
End: 2022-12-07

## 2022-12-07 VITALS
DIASTOLIC BLOOD PRESSURE: 78 MMHG | SYSTOLIC BLOOD PRESSURE: 138 MMHG | OXYGEN SATURATION: 98 % | TEMPERATURE: 97 F | WEIGHT: 174.4 LBS | BODY MASS INDEX: 29.06 KG/M2 | HEART RATE: 93 BPM | HEIGHT: 65 IN

## 2022-12-07 DIAGNOSIS — I10 PRIMARY HYPERTENSION: ICD-10-CM

## 2022-12-07 DIAGNOSIS — E78.5 HYPERLIPIDEMIA, UNSPECIFIED HYPERLIPIDEMIA TYPE: ICD-10-CM

## 2022-12-07 DIAGNOSIS — Z11.59 NEED FOR HEPATITIS C SCREENING TEST: ICD-10-CM

## 2022-12-07 DIAGNOSIS — R79.89 ELEVATED LFTS: Primary | ICD-10-CM

## 2022-12-07 DIAGNOSIS — G25.81 RESTLESS LEGS: ICD-10-CM

## 2022-12-07 PROCEDURE — 99214 OFFICE O/P EST MOD 30 MIN: CPT | Performed by: NURSE PRACTITIONER

## 2022-12-07 RX ORDER — ROSUVASTATIN CALCIUM 20 MG/1
20 TABLET, COATED ORAL DAILY
Qty: 90 TABLET | Refills: 1 | Status: SHIPPED | OUTPATIENT
Start: 2022-12-07

## 2022-12-07 RX ORDER — LISINOPRIL 20 MG/1
TABLET ORAL
COMMUNITY
Start: 2022-10-18 | End: 2022-12-07 | Stop reason: SDUPTHER

## 2022-12-07 RX ORDER — PRAMIPEXOLE DIHYDROCHLORIDE 0.25 MG/1
0.25 TABLET ORAL NIGHTLY
Qty: 90 TABLET | Refills: 1 | Status: SHIPPED | OUTPATIENT
Start: 2022-12-07

## 2022-12-07 RX ORDER — AMLODIPINE BESYLATE 5 MG/1
5 TABLET ORAL DAILY
Qty: 90 TABLET | Refills: 1 | Status: SHIPPED | OUTPATIENT
Start: 2022-12-07

## 2022-12-07 RX ORDER — LISINOPRIL 20 MG/1
20 TABLET ORAL DAILY
Qty: 90 TABLET | Refills: 1 | Status: SHIPPED | OUTPATIENT
Start: 2022-12-07 | End: 2022-12-27

## 2022-12-07 NOTE — PROGRESS NOTES
"Chief Complaint  Hypertension (3 month follow up)    Subjective         Barb Clinton presents to [facility]  Patient presents to the office today for 3-month follow-up regarding hypertension.  Blood pressure is 130/78.  She denies any chest pain shortness breath palpitations this time.  Patient does state that she has questions regarding some of her medications.  She states that she has been having frequent muscle cramps.  She states that this started after increasing her dosage of Lipitor.  I did explain that this is a common side effect.  I explained that we would also change her medication to Crestor to see if she was able to tolerate this better.  Patient does states that she is needing refills on her medications.  Also explained that we need to recheck her lab work secondary to her elevated LFTs.  If these do remain elevated she will be referred to gastroenterology for further evaluation.       Objective     Vitals:    12/07/22 0903   BP: 138/78   BP Location: Right arm   Patient Position: Sitting   Cuff Size: Adult   Pulse: 93   Temp: 97 °F (36.1 °C)   TempSrc: Temporal   SpO2: 98%   Weight: 79.1 kg (174 lb 6.4 oz)   Height: 165.1 cm (65\")      Body mass index is 29.02 kg/m².    BMI is >= 25 and <30. (Overweight) The following options were offered after discussion;: nutrition counseling/recommendations        Physical Exam  Vitals reviewed.   Constitutional:       Appearance: Normal appearance.   Cardiovascular:      Rate and Rhythm: Normal rate and regular rhythm.      Pulses: Normal pulses.      Heart sounds: Normal heart sounds, S1 normal and S2 normal. No murmur heard.  Pulmonary:      Effort: Pulmonary effort is normal. No respiratory distress.      Breath sounds: Normal breath sounds.   Skin:     General: Skin is warm and dry.   Neurological:      Mental Status: She is alert and oriented to person, place, and time.   Psychiatric:         Attention and Perception: " Attention normal.         Mood and Affect: Mood normal.         Behavior: Behavior normal.          Result Review :   The following data was reviewed by: MICHAELA Newby on 12/07/2022:    Procedures    Assessment and Plan   Diagnoses and all orders for this visit:    1. Elevated LFTs (Primary)  -     Comprehensive metabolic panel; Future  -     Anti-Smooth Muscle Antibody Titer; Future  -     Ceruloplasmin; Future  -     IgG; Future    2. Restless legs  -     pramipexole (MIRAPEX) 0.25 MG tablet; Take 1 tablet by mouth Every Night.  Dispense: 90 tablet; Refill: 1    3. Primary hypertension  -     amLODIPine (NORVASC) 5 MG tablet; Take 1 tablet by mouth Daily.  Dispense: 90 tablet; Refill: 1  -     lisinopril (PRINIVIL,ZESTRIL) 20 MG tablet; Take 1 tablet by mouth Daily.  Dispense: 90 tablet; Refill: 1  -     Comprehensive metabolic panel; Future    4. Hyperlipidemia, unspecified hyperlipidemia type  -     rosuvastatin (Crestor) 20 MG tablet; Take 1 tablet by mouth Daily.  Dispense: 90 tablet; Refill: 1  -     Comprehensive metabolic panel; Future  -     Lipid Panel; Future    5. Need for hepatitis C screening test  -     Hepatitis C antibody; Future          Follow Up   Return in about 6 months (around 6/7/2023) for Recheck.  Patient was given instructions and counseling regarding her condition or for health maintenance advice. Please see specific information pulled into the AVS if appropriate.

## 2022-12-13 ENCOUNTER — LAB (OUTPATIENT)
Dept: LAB | Facility: HOSPITAL | Age: 57
End: 2022-12-13

## 2022-12-13 DIAGNOSIS — R79.89 ELEVATED LFTS: ICD-10-CM

## 2022-12-13 DIAGNOSIS — E78.5 HYPERLIPIDEMIA, UNSPECIFIED HYPERLIPIDEMIA TYPE: ICD-10-CM

## 2022-12-13 DIAGNOSIS — I10 PRIMARY HYPERTENSION: ICD-10-CM

## 2022-12-13 DIAGNOSIS — Z11.59 NEED FOR HEPATITIS C SCREENING TEST: ICD-10-CM

## 2022-12-13 LAB
ALBUMIN SERPL-MCNC: 4.5 G/DL (ref 3.5–5.2)
ALBUMIN/GLOB SERPL: 1.8 G/DL
ALP SERPL-CCNC: 61 U/L (ref 39–117)
ALT SERPL W P-5'-P-CCNC: 22 U/L (ref 1–33)
ANION GAP SERPL CALCULATED.3IONS-SCNC: 10.2 MMOL/L (ref 5–15)
AST SERPL-CCNC: 27 U/L (ref 1–32)
BILIRUB SERPL-MCNC: 0.2 MG/DL (ref 0–1.2)
BUN SERPL-MCNC: 20 MG/DL (ref 6–20)
BUN/CREAT SERPL: 16.7 (ref 7–25)
CALCIUM SPEC-SCNC: 9.8 MG/DL (ref 8.6–10.5)
CERULOPLASMIN SERPL-MCNC: 23 MG/DL (ref 19–39)
CHLORIDE SERPL-SCNC: 106 MMOL/L (ref 98–107)
CHOLEST SERPL-MCNC: 189 MG/DL (ref 0–200)
CO2 SERPL-SCNC: 23.8 MMOL/L (ref 22–29)
CREAT SERPL-MCNC: 1.2 MG/DL (ref 0.57–1)
EGFRCR SERPLBLD CKD-EPI 2021: 52.9 ML/MIN/1.73
GLOBULIN UR ELPH-MCNC: 2.5 GM/DL
GLUCOSE SERPL-MCNC: 91 MG/DL (ref 65–99)
HCV AB SER DONR QL: NORMAL
HDLC SERPL-MCNC: 49 MG/DL (ref 40–60)
IGG1 SER-MCNC: 854 MG/DL (ref 700–1600)
LDLC SERPL CALC-MCNC: 118 MG/DL (ref 0–100)
LDLC/HDLC SERPL: 2.36 {RATIO}
POTASSIUM SERPL-SCNC: 4.5 MMOL/L (ref 3.5–5.2)
PROT SERPL-MCNC: 7 G/DL (ref 6–8.5)
SODIUM SERPL-SCNC: 140 MMOL/L (ref 136–145)
TRIGL SERPL-MCNC: 121 MG/DL (ref 0–150)
VLDLC SERPL-MCNC: 22 MG/DL (ref 5–40)

## 2022-12-13 PROCEDURE — 36415 COLL VENOUS BLD VENIPUNCTURE: CPT

## 2022-12-13 PROCEDURE — 80061 LIPID PANEL: CPT

## 2022-12-13 PROCEDURE — 80053 COMPREHEN METABOLIC PANEL: CPT

## 2022-12-13 PROCEDURE — 86803 HEPATITIS C AB TEST: CPT

## 2022-12-13 PROCEDURE — 82390 ASSAY OF CERULOPLASMIN: CPT

## 2022-12-13 PROCEDURE — 86015 ACTIN ANTIBODY EACH: CPT

## 2022-12-13 PROCEDURE — 82784 ASSAY IGA/IGD/IGG/IGM EACH: CPT

## 2022-12-14 LAB — SMA IGG SER-ACNC: 6 UNITS (ref 0–19)

## 2022-12-24 DIAGNOSIS — I10 PRIMARY HYPERTENSION: ICD-10-CM

## 2022-12-27 RX ORDER — LISINOPRIL 20 MG/1
20 TABLET ORAL DAILY
Qty: 90 TABLET | Refills: 3 | Status: SHIPPED | OUTPATIENT
Start: 2022-12-27

## 2022-12-31 DIAGNOSIS — M54.2 CERVICAL PAIN (NECK): ICD-10-CM

## 2023-01-03 RX ORDER — CYCLOBENZAPRINE HCL 10 MG
10 TABLET ORAL
Qty: 90 TABLET | Refills: 1 | Status: SHIPPED | OUTPATIENT
Start: 2023-01-03

## 2023-03-16 DIAGNOSIS — E78.5 HYPERLIPIDEMIA, UNSPECIFIED HYPERLIPIDEMIA TYPE: ICD-10-CM

## 2023-03-16 RX ORDER — FENOFIBRATE 145 MG/1
145 TABLET, COATED ORAL DAILY
Qty: 90 TABLET | Refills: 3 | Status: SHIPPED | OUTPATIENT
Start: 2023-03-16

## 2023-05-16 DIAGNOSIS — G25.81 RESTLESS LEGS: ICD-10-CM

## 2023-05-16 RX ORDER — PRAMIPEXOLE DIHYDROCHLORIDE 0.25 MG/1
0.25 TABLET ORAL NIGHTLY
Qty: 90 TABLET | Refills: 1 | Status: SHIPPED | OUTPATIENT
Start: 2023-05-16

## 2023-06-07 ENCOUNTER — OFFICE VISIT (OUTPATIENT)
Dept: FAMILY MEDICINE CLINIC | Facility: CLINIC | Age: 58
End: 2023-06-07
Payer: COMMERCIAL

## 2023-06-07 VITALS
OXYGEN SATURATION: 97 % | SYSTOLIC BLOOD PRESSURE: 108 MMHG | HEIGHT: 65 IN | TEMPERATURE: 96.1 F | DIASTOLIC BLOOD PRESSURE: 74 MMHG | BODY MASS INDEX: 29.96 KG/M2 | HEART RATE: 103 BPM | WEIGHT: 179.8 LBS

## 2023-06-07 DIAGNOSIS — M25.561 CHRONIC PAIN OF BOTH KNEES: Primary | ICD-10-CM

## 2023-06-07 DIAGNOSIS — M19.90 OSTEOARTHRITIS, UNSPECIFIED OSTEOARTHRITIS TYPE, UNSPECIFIED SITE: ICD-10-CM

## 2023-06-07 DIAGNOSIS — J30.9 ALLERGIC RHINITIS, UNSPECIFIED SEASONALITY, UNSPECIFIED TRIGGER: ICD-10-CM

## 2023-06-07 DIAGNOSIS — M25.562 CHRONIC PAIN OF BOTH KNEES: Primary | ICD-10-CM

## 2023-06-07 DIAGNOSIS — G25.81 RESTLESS LEGS: ICD-10-CM

## 2023-06-07 DIAGNOSIS — M54.2 CERVICAL PAIN (NECK): ICD-10-CM

## 2023-06-07 DIAGNOSIS — E78.5 HYPERLIPIDEMIA, UNSPECIFIED HYPERLIPIDEMIA TYPE: ICD-10-CM

## 2023-06-07 DIAGNOSIS — I10 PRIMARY HYPERTENSION: ICD-10-CM

## 2023-06-07 DIAGNOSIS — G89.29 CHRONIC PAIN OF BOTH KNEES: Primary | ICD-10-CM

## 2023-06-07 RX ORDER — MONTELUKAST SODIUM 10 MG/1
10 TABLET ORAL
Qty: 90 TABLET | Refills: 3 | Status: SHIPPED | OUTPATIENT
Start: 2023-06-07

## 2023-06-07 RX ORDER — FENOFIBRATE 145 MG/1
145 TABLET, COATED ORAL DAILY
Qty: 90 TABLET | Refills: 3 | Status: SHIPPED | OUTPATIENT
Start: 2023-06-07

## 2023-06-07 RX ORDER — LISINOPRIL 20 MG/1
20 TABLET ORAL DAILY
Qty: 90 TABLET | Refills: 3 | Status: SHIPPED | OUTPATIENT
Start: 2023-06-07

## 2023-06-07 RX ORDER — AMLODIPINE BESYLATE 5 MG/1
5 TABLET ORAL DAILY
Qty: 90 TABLET | Refills: 1 | Status: SHIPPED | OUTPATIENT
Start: 2023-06-07

## 2023-06-07 RX ORDER — MELOXICAM 15 MG/1
15 TABLET ORAL DAILY
Qty: 90 TABLET | Refills: 1 | Status: CANCELLED | OUTPATIENT
Start: 2023-06-07

## 2023-06-07 RX ORDER — ROSUVASTATIN CALCIUM 20 MG/1
20 TABLET, COATED ORAL DAILY
Qty: 90 TABLET | Refills: 1 | Status: SHIPPED | OUTPATIENT
Start: 2023-06-07

## 2023-06-07 RX ORDER — CYCLOBENZAPRINE HCL 10 MG
10 TABLET ORAL
Qty: 90 TABLET | Refills: 1 | Status: SHIPPED | OUTPATIENT
Start: 2023-06-07

## 2023-06-07 RX ORDER — PRAMIPEXOLE DIHYDROCHLORIDE 0.25 MG/1
0.25 TABLET ORAL NIGHTLY
Qty: 90 TABLET | Refills: 1 | Status: SHIPPED | OUTPATIENT
Start: 2023-06-07

## 2023-06-07 RX ORDER — ALBUTEROL SULFATE 90 UG/1
2 AEROSOL, METERED RESPIRATORY (INHALATION) EVERY 4 HOURS PRN
Qty: 18 G | Refills: 5 | Status: SHIPPED | OUTPATIENT
Start: 2023-06-07

## 2023-06-07 NOTE — PROGRESS NOTES
"Chief Complaint  Hyperlipidemia (6 month follow up)    Subjective         Barb Clinton presents to [facility]  Presents to the office for 6-month follow-up.  Explained that she is due for routine lab work.  She does state that she had a mammogram completed in April in [place].  She will provide a release of records for us to obtain the results.  Patient does complain of bilateral knee pain.  She states that this has been going on for years.  She states that she has seen orthopedics in the past.  She states that they did complete injections in her knees and she states that she did not want to go back.  She does state the diclofenac seems to help better than then meloxicam.  I did state that we need to obtain her lab work to reevaluate her kidney function.  I did ask her to hold meloxicam until the lab results were obtained.  Blood pressure is 108/74.  Denies any chest pain shortness breath palpitations this time.  She denies any other concerns or complaints at this time    Hyperlipidemia       Objective     Vitals:    06/07/23 0937   BP: 108/74   BP Location: Right arm   Patient Position: Sitting   Cuff Size: Adult   Pulse: 103   Temp: 96.1 °F (35.6 °C)   TempSrc: Temporal   SpO2: 97%   Weight: 81.6 kg (179 lb 12.8 oz)   Height: 165.1 cm (65\")      Body mass index is 29.92 kg/m².    BMI is >= 25 and <30. (Overweight) The following options were offered after discussion;: nutrition counseling/recommendations        Physical Exam  Vitals reviewed.   Constitutional:       Appearance: Normal appearance.   Cardiovascular:      Rate and Rhythm: Normal rate and regular rhythm.      Pulses: Normal pulses.      Heart sounds: Normal heart sounds, S1 normal and S2 normal. No murmur heard.  Pulmonary:      Effort: Pulmonary effort is normal. No respiratory distress.      Breath sounds: Normal breath sounds.   Musculoskeletal:      Right knee: Crepitus present. Tenderness present.      Left " knee: Crepitus present. Tenderness present.   Skin:     General: Skin is warm and dry.   Neurological:      Mental Status: She is alert and oriented to person, place, and time.   Psychiatric:         Attention and Perception: Attention normal.         Mood and Affect: Mood normal.         Behavior: Behavior normal.        Result Review :   The following data was reviewed by: MICHAELA Newby on 06/07/2023:      Procedures    Assessment and Plan   Diagnoses and all orders for this visit:    1. Chronic pain of both knees (Primary)  -     XR Knee 3 View Left; Future  -     XR Knee 3 View Right; Future    2. Primary hypertension  -     amLODIPine (NORVASC) 5 MG tablet; Take 1 tablet by mouth Daily.  Dispense: 90 tablet; Refill: 1  -     lisinopril (PRINIVIL,ZESTRIL) 20 MG tablet; Take 1 tablet by mouth Daily.  Dispense: 90 tablet; Refill: 3  -     CBC (No Diff); Future  -     Comprehensive Metabolic Panel; Future  -     Lipid Panel; Future    3. Cervical pain (neck)  -     cyclobenzaprine (FLEXERIL) 10 MG tablet; Take 1 tablet by mouth every night at bedtime.  Dispense: 90 tablet; Refill: 1    4. Hyperlipidemia, unspecified hyperlipidemia type  -     fenofibrate (TRICOR) 145 MG tablet; Take 1 tablet by mouth Daily.  Dispense: 90 tablet; Refill: 3  -     rosuvastatin (Crestor) 20 MG tablet; Take 1 tablet by mouth Daily.  Dispense: 90 tablet; Refill: 1  -     CBC (No Diff); Future  -     Comprehensive Metabolic Panel; Future  -     Lipid Panel; Future    5. Osteoarthritis, unspecified osteoarthritis type, unspecified site  Comments:  Hold meloxicam until lab orders are obtained    6. Allergic rhinitis, unspecified seasonality, unspecified trigger  -     montelukast (SINGULAIR) 10 MG tablet; Take 1 tablet by mouth every night at bedtime.  Dispense: 90 tablet; Refill: 3    7. Restless legs  -     pramipexole (MIRAPEX) 0.25 MG tablet; Take 1 tablet by mouth Every Night.  Dispense: 90 tablet; Refill: 1    Other orders  -      albuterol sulfate  (90 Base) MCG/ACT inhaler; Inhale 2 puffs Every 4 (Four) Hours As Needed for Wheezing.  Dispense: 18 g; Refill: 5          Follow Up   Return in about 6 months (around 12/7/2023) for Recheck.  Patient was given instructions and counseling regarding her condition or for health maintenance advice. Please see specific information pulled into the AVS if appropriate.

## 2023-06-15 ENCOUNTER — HOSPITAL ENCOUNTER (OUTPATIENT)
Dept: GENERAL RADIOLOGY | Facility: HOSPITAL | Age: 58
Discharge: HOME | End: 2023-06-15
Payer: COMMERCIAL

## 2023-06-15 ENCOUNTER — LAB (OUTPATIENT)
Dept: LAB | Facility: HOSPITAL | Age: 58
End: 2023-06-15
Payer: COMMERCIAL

## 2023-06-15 DIAGNOSIS — M25.561 CHRONIC PAIN OF BOTH KNEES: ICD-10-CM

## 2023-06-15 DIAGNOSIS — G89.29 CHRONIC PAIN OF BOTH KNEES: ICD-10-CM

## 2023-06-15 DIAGNOSIS — M25.562 CHRONIC PAIN OF BOTH KNEES: ICD-10-CM

## 2023-06-15 DIAGNOSIS — E78.5 HYPERLIPIDEMIA, UNSPECIFIED HYPERLIPIDEMIA TYPE: ICD-10-CM

## 2023-06-15 DIAGNOSIS — I10 PRIMARY HYPERTENSION: ICD-10-CM

## 2023-06-15 LAB
CHOLEST SERPL-MCNC: 196 MG/DL (ref 0–200)
DEPRECATED RDW RBC AUTO: 38.9 FL (ref 37–54)
ERYTHROCYTE [DISTWIDTH] IN BLOOD BY AUTOMATED COUNT: 12.5 % (ref 12.3–15.4)
HCT VFR BLD AUTO: 38.1 % (ref 34–46.6)
HDLC SERPL-MCNC: 55 MG/DL (ref 40–60)
HGB BLD-MCNC: 12.7 G/DL (ref 12–15.9)
LDLC SERPL CALC-MCNC: 126 MG/DL (ref 0–100)
LDLC/HDLC SERPL: 2.25 {RATIO}
MCH RBC QN AUTO: 28.4 PG (ref 26.6–33)
MCHC RBC AUTO-ENTMCNC: 33.3 G/DL (ref 31.5–35.7)
MCV RBC AUTO: 85.2 FL (ref 79–97)
PLATELET # BLD AUTO: 298 10*3/MM3 (ref 140–450)
PMV BLD AUTO: 10.9 FL (ref 6–12)
RBC # BLD AUTO: 4.47 10*6/MM3 (ref 3.77–5.28)
TRIGL SERPL-MCNC: 85 MG/DL (ref 0–150)
VLDLC SERPL-MCNC: 15 MG/DL (ref 5–40)
WBC NRBC COR # BLD: 6.14 10*3/MM3 (ref 3.4–10.8)

## 2023-06-15 PROCEDURE — 73562 X-RAY EXAM OF KNEE 3: CPT

## 2023-06-15 PROCEDURE — 85027 COMPLETE CBC AUTOMATED: CPT

## 2023-06-15 PROCEDURE — 80061 LIPID PANEL: CPT

## 2023-06-15 PROCEDURE — 36415 COLL VENOUS BLD VENIPUNCTURE: CPT

## 2023-06-15 PROCEDURE — 80053 COMPREHEN METABOLIC PANEL: CPT

## 2023-06-16 LAB
ALBUMIN SERPL-MCNC: 4.4 G/DL (ref 3.5–5.2)
ALBUMIN/GLOB SERPL: 1.6 G/DL
ALP SERPL-CCNC: 81 U/L (ref 39–117)
ALT SERPL W P-5'-P-CCNC: 26 U/L (ref 1–33)
ANION GAP SERPL CALCULATED.3IONS-SCNC: 15.4 MMOL/L (ref 5–15)
AST SERPL-CCNC: 25 U/L (ref 1–32)
BILIRUB SERPL-MCNC: 0.4 MG/DL (ref 0–1.2)
BUN SERPL-MCNC: 24 MG/DL (ref 6–20)
BUN/CREAT SERPL: 17.8 (ref 7–25)
CALCIUM SPEC-SCNC: 12 MG/DL (ref 8.6–10.5)
CHLORIDE SERPL-SCNC: 101 MMOL/L (ref 98–107)
CO2 SERPL-SCNC: 22.6 MMOL/L (ref 22–29)
CREAT SERPL-MCNC: 1.35 MG/DL (ref 0.57–1)
EGFRCR SERPLBLD CKD-EPI 2021: 45.6 ML/MIN/1.73
GLOBULIN UR ELPH-MCNC: 2.7 GM/DL
GLUCOSE SERPL-MCNC: 100 MG/DL (ref 65–99)
POTASSIUM SERPL-SCNC: 4.2 MMOL/L (ref 3.5–5.2)
PROT SERPL-MCNC: 7.1 G/DL (ref 6–8.5)
SODIUM SERPL-SCNC: 139 MMOL/L (ref 136–145)

## 2023-07-28 ENCOUNTER — TELEPHONE (OUTPATIENT)
Dept: FAMILY MEDICINE CLINIC | Facility: CLINIC | Age: 58
End: 2023-07-28
Payer: COMMERCIAL

## 2023-12-08 ENCOUNTER — OFFICE VISIT (OUTPATIENT)
Dept: FAMILY MEDICINE CLINIC | Facility: CLINIC | Age: 58
End: 2023-12-08
Payer: COMMERCIAL

## 2023-12-08 VITALS
SYSTOLIC BLOOD PRESSURE: 126 MMHG | HEIGHT: 65 IN | HEART RATE: 93 BPM | TEMPERATURE: 96.8 F | OXYGEN SATURATION: 99 % | BODY MASS INDEX: 28.16 KG/M2 | WEIGHT: 169 LBS | DIASTOLIC BLOOD PRESSURE: 62 MMHG

## 2023-12-08 DIAGNOSIS — G25.81 RESTLESS LEGS: ICD-10-CM

## 2023-12-08 DIAGNOSIS — M19.90 OSTEOARTHRITIS, UNSPECIFIED OSTEOARTHRITIS TYPE, UNSPECIFIED SITE: ICD-10-CM

## 2023-12-08 DIAGNOSIS — E78.5 HYPERLIPIDEMIA, UNSPECIFIED HYPERLIPIDEMIA TYPE: ICD-10-CM

## 2023-12-08 DIAGNOSIS — E66.3 OVERWEIGHT (BMI 25.0-29.9): ICD-10-CM

## 2023-12-08 DIAGNOSIS — M54.2 CERVICAL PAIN (NECK): ICD-10-CM

## 2023-12-08 DIAGNOSIS — I10 PRIMARY HYPERTENSION: Primary | ICD-10-CM

## 2023-12-08 DIAGNOSIS — Z71.3 NUTRITIONAL COUNSELING: ICD-10-CM

## 2023-12-08 DIAGNOSIS — J30.9 ALLERGIC RHINITIS, UNSPECIFIED SEASONALITY, UNSPECIFIED TRIGGER: ICD-10-CM

## 2023-12-08 RX ORDER — DULOXETIN HYDROCHLORIDE 30 MG/1
60 CAPSULE, DELAYED RELEASE ORAL DAILY
Qty: 180 CAPSULE | Refills: 1 | Status: SHIPPED | OUTPATIENT
Start: 2023-12-08

## 2023-12-08 RX ORDER — ROSUVASTATIN CALCIUM 20 MG/1
20 TABLET, COATED ORAL DAILY
Qty: 90 TABLET | Refills: 1 | Status: SHIPPED | OUTPATIENT
Start: 2023-12-08

## 2023-12-08 RX ORDER — PRAMIPEXOLE DIHYDROCHLORIDE 0.25 MG/1
0.25 TABLET ORAL NIGHTLY
Qty: 90 TABLET | Refills: 1 | Status: SHIPPED | OUTPATIENT
Start: 2023-12-08

## 2023-12-08 RX ORDER — LISINOPRIL 20 MG/1
20 TABLET ORAL DAILY
Qty: 90 TABLET | Refills: 3 | Status: SHIPPED | OUTPATIENT
Start: 2023-12-08

## 2023-12-08 RX ORDER — CYCLOBENZAPRINE HCL 10 MG
10 TABLET ORAL
Qty: 90 TABLET | Refills: 1 | Status: SHIPPED | OUTPATIENT
Start: 2023-12-08

## 2023-12-08 RX ORDER — ALBUTEROL SULFATE 90 UG/1
2 AEROSOL, METERED RESPIRATORY (INHALATION) EVERY 4 HOURS PRN
Qty: 18 G | Refills: 5 | Status: SHIPPED | OUTPATIENT
Start: 2023-12-08

## 2023-12-08 RX ORDER — PHENTERMINE HYDROCHLORIDE 37.5 MG/1
37.5 TABLET ORAL
Qty: 30 TABLET | Refills: 0 | Status: SHIPPED | OUTPATIENT
Start: 2023-12-08

## 2023-12-08 RX ORDER — MONTELUKAST SODIUM 10 MG/1
10 TABLET ORAL
Qty: 90 TABLET | Refills: 3 | Status: SHIPPED | OUTPATIENT
Start: 2023-12-08

## 2023-12-08 RX ORDER — MELOXICAM 15 MG/1
15 TABLET ORAL DAILY
Qty: 90 TABLET | Refills: 3 | Status: CANCELLED | OUTPATIENT
Start: 2023-12-08

## 2023-12-08 RX ORDER — FENOFIBRATE 145 MG/1
145 TABLET, COATED ORAL DAILY
Qty: 90 TABLET | Refills: 3 | Status: SHIPPED | OUTPATIENT
Start: 2023-12-08

## 2023-12-08 RX ORDER — AMLODIPINE BESYLATE 5 MG/1
5 TABLET ORAL DAILY
Qty: 90 TABLET | Refills: 1 | Status: SHIPPED | OUTPATIENT
Start: 2023-12-08

## 2023-12-08 NOTE — PROGRESS NOTES
"Chief Complaint  Hyperlipidemia (6 month follow up)    Subjective         Barb Clinton presents to [facility]  Presents to the office for 6-month follow-up regarding her hypertension.  Blood pressure is 126/62.  She denies any chest pain shortness breath palpitations at this time.  Patient does state that she is still having significant joint pains.  She does want to increase her meloxicam.  I did discuss that we needed to discontinue her meloxicam because of her kidney function.  Patient verbalized understanding I did discuss starting duloxetine to help with the joint pains.  Patient does state that she is needing refills on the rest of her medicines.  She also states that she would like to start weight loss medication as she has been trying to eat healthier and be more active but she is not losing any weight.  I did discuss the risk and the benefits as well as possible side effects of the medications.  Patient verbalized understanding and states that she would still like to continue.    Hyperlipidemia    Hypertension         Objective     Vitals:    12/08/23 1007   BP: 126/62   BP Location: Right arm   Patient Position: Sitting   Cuff Size: Adult   Pulse: 93   Temp: 96.8 °F (36 °C)   TempSrc: Temporal   SpO2: 99%   Weight: 76.7 kg (169 lb)   Height: 165.1 cm (65\")      Body mass index is 28.12 kg/m².             Physical Exam  Vitals reviewed.   Constitutional:       Appearance: Normal appearance.   Cardiovascular:      Rate and Rhythm: Normal rate and regular rhythm.      Pulses: Normal pulses.      Heart sounds: Normal heart sounds, S1 normal and S2 normal. No murmur heard.  Pulmonary:      Effort: Pulmonary effort is normal. No respiratory distress.      Breath sounds: Normal breath sounds.   Skin:     General: Skin is warm and dry.   Neurological:      Mental Status: She is alert and oriented to person, place, and time.   Psychiatric:         Attention and Perception: " Attention normal.         Mood and Affect: Mood normal.         Behavior: Behavior normal.          Result Review :   The following data was reviewed by: MICHAELA Newby on 12/08/2023:      Procedures    Assessment and Plan   Diagnoses and all orders for this visit:    1. Primary hypertension (Primary)  -     CBC (No Diff); Future  -     Comprehensive Metabolic Panel; Future  -     Lipid Panel; Future  -     TSH; Future  -     amLODIPine (NORVASC) 5 MG tablet; Take 1 tablet by mouth Daily.  Dispense: 90 tablet; Refill: 1  -     lisinopril (PRINIVIL,ZESTRIL) 20 MG tablet; Take 1 tablet by mouth Daily.  Dispense: 90 tablet; Refill: 3    2. Hyperlipidemia, unspecified hyperlipidemia type  -     CBC (No Diff); Future  -     Comprehensive Metabolic Panel; Future  -     Lipid Panel; Future  -     fenofibrate (TRICOR) 145 MG tablet; Take 1 tablet by mouth Daily.  Dispense: 90 tablet; Refill: 3  -     rosuvastatin (Crestor) 20 MG tablet; Take 1 tablet by mouth Daily.  Dispense: 90 tablet; Refill: 1    3. Cervical pain (neck)  -     cyclobenzaprine (FLEXERIL) 10 MG tablet; Take 1 tablet by mouth every night at bedtime.  Dispense: 90 tablet; Refill: 1  -     DULoxetine (CYMBALTA) 30 MG capsule; Take 2 capsules by mouth Daily. Take 1 capsule by mouth daily x 1 week then take 2 capsules daily thereafter  Dispense: 180 capsule; Refill: 1    4. Osteoarthritis, unspecified osteoarthritis type, unspecified site  -     DULoxetine (CYMBALTA) 30 MG capsule; Take 2 capsules by mouth Daily. Take 1 capsule by mouth daily x 1 week then take 2 capsules daily thereafter  Dispense: 180 capsule; Refill: 1    5. Allergic rhinitis, unspecified seasonality, unspecified trigger  -     montelukast (SINGULAIR) 10 MG tablet; Take 1 tablet by mouth every night at bedtime.  Dispense: 90 tablet; Refill: 3    6. Restless legs  -     pramipexole (MIRAPEX) 0.25 MG tablet; Take 1 tablet by mouth Every Night.  Dispense: 90 tablet; Refill: 1    7.  Overweight (BMI 25.0-29.9)  -     POC Urine Drug Screen [facility]  -     phentermine (ADIPEX-P) 37.5 MG tablet; Take 1 tablet by mouth Every Morning Before Breakfast.  Dispense: 30 tablet; Refill: 0    8. Nutritional counseling  -     POC Urine Drug Screen [facility]  -     phentermine (ADIPEX-P) 37.5 MG tablet; Take 1 tablet by mouth Every Morning Before Breakfast.  Dispense: 30 tablet; Refill: 0    Other orders  -     albuterol sulfate  (90 Base) MCG/ACT inhaler; Inhale 2 puffs Every 4 (Four) Hours As Needed for Wheezing.  Dispense: 18 g; Refill: 5          Follow Up   Return in about 1 month (around 1/8/2024) for Recheck.  Patient was given instructions and counseling regarding her condition or for health maintenance advice. Please see specific information pulled into the AVS if appropriate.

## 2024-01-16 ENCOUNTER — LAB (OUTPATIENT)
Dept: LAB | Facility: HOSPITAL | Age: 59
End: 2024-01-16
Payer: COMMERCIAL

## 2024-01-16 DIAGNOSIS — E78.5 HYPERLIPIDEMIA, UNSPECIFIED HYPERLIPIDEMIA TYPE: ICD-10-CM

## 2024-01-16 DIAGNOSIS — I10 PRIMARY HYPERTENSION: ICD-10-CM

## 2024-01-16 LAB
ALBUMIN SERPL-MCNC: 4.5 G/DL (ref 3.5–5.2)
ALBUMIN/GLOB SERPL: 1.7 G/DL
ALP SERPL-CCNC: 135 U/L (ref 39–117)
ALT SERPL W P-5'-P-CCNC: 60 U/L (ref 1–33)
ANION GAP SERPL CALCULATED.3IONS-SCNC: 10 MMOL/L (ref 5–15)
AST SERPL-CCNC: 59 U/L (ref 1–32)
BILIRUB SERPL-MCNC: 0.3 MG/DL (ref 0–1.2)
BUN SERPL-MCNC: 17 MG/DL (ref 6–20)
BUN/CREAT SERPL: 16.7 (ref 7–25)
CALCIUM SPEC-SCNC: 10.7 MG/DL (ref 8.6–10.5)
CHLORIDE SERPL-SCNC: 100 MMOL/L (ref 98–107)
CHOLEST SERPL-MCNC: 160 MG/DL (ref 0–200)
CO2 SERPL-SCNC: 27 MMOL/L (ref 22–29)
CREAT SERPL-MCNC: 1.02 MG/DL (ref 0.57–1)
DEPRECATED RDW RBC AUTO: 39.9 FL (ref 37–54)
EGFRCR SERPLBLD CKD-EPI 2021: 63.9 ML/MIN/1.73
ERYTHROCYTE [DISTWIDTH] IN BLOOD BY AUTOMATED COUNT: 13.4 % (ref 12.3–15.4)
GLOBULIN UR ELPH-MCNC: 2.6 GM/DL
GLUCOSE SERPL-MCNC: 90 MG/DL (ref 65–99)
HCT VFR BLD AUTO: 38.8 % (ref 34–46.6)
HDLC SERPL-MCNC: 51 MG/DL (ref 40–60)
HGB BLD-MCNC: 12.7 G/DL (ref 12–15.9)
LDLC SERPL CALC-MCNC: 88 MG/DL (ref 0–100)
LDLC/HDLC SERPL: 1.67 {RATIO}
MCH RBC QN AUTO: 27.1 PG (ref 26.6–33)
MCHC RBC AUTO-ENTMCNC: 32.7 G/DL (ref 31.5–35.7)
MCV RBC AUTO: 82.7 FL (ref 79–97)
PLATELET # BLD AUTO: 242 10*3/MM3 (ref 140–450)
PMV BLD AUTO: 10.6 FL (ref 6–12)
POTASSIUM SERPL-SCNC: 4.2 MMOL/L (ref 3.5–5.2)
PROT SERPL-MCNC: 7.1 G/DL (ref 6–8.5)
RBC # BLD AUTO: 4.69 10*6/MM3 (ref 3.77–5.28)
SODIUM SERPL-SCNC: 137 MMOL/L (ref 136–145)
TRIGL SERPL-MCNC: 118 MG/DL (ref 0–150)
TSH SERPL DL<=0.05 MIU/L-ACNC: 0.16 UIU/ML (ref 0.27–4.2)
VLDLC SERPL-MCNC: 21 MG/DL (ref 5–40)
WBC NRBC COR # BLD AUTO: 5.38 10*3/MM3 (ref 3.4–10.8)

## 2024-01-16 PROCEDURE — 36415 COLL VENOUS BLD VENIPUNCTURE: CPT

## 2024-01-16 PROCEDURE — 84443 ASSAY THYROID STIM HORMONE: CPT

## 2024-01-16 PROCEDURE — 85027 COMPLETE CBC AUTOMATED: CPT

## 2024-01-16 PROCEDURE — 80061 LIPID PANEL: CPT

## 2024-01-16 PROCEDURE — 80053 COMPREHEN METABOLIC PANEL: CPT

## 2024-01-17 ENCOUNTER — OFFICE VISIT (OUTPATIENT)
Dept: FAMILY MEDICINE CLINIC | Facility: CLINIC | Age: 59
End: 2024-01-17
Payer: COMMERCIAL

## 2024-01-17 VITALS
DIASTOLIC BLOOD PRESSURE: 70 MMHG | SYSTOLIC BLOOD PRESSURE: 128 MMHG | OXYGEN SATURATION: 99 % | HEIGHT: 65 IN | HEART RATE: 103 BPM | TEMPERATURE: 97.3 F | BODY MASS INDEX: 26.79 KG/M2 | WEIGHT: 160.8 LBS

## 2024-01-17 DIAGNOSIS — E83.52 HYPERCALCEMIA: Primary | ICD-10-CM

## 2024-01-17 DIAGNOSIS — R79.89 LOW TSH LEVEL: ICD-10-CM

## 2024-01-17 DIAGNOSIS — Z71.3 NUTRITIONAL COUNSELING: ICD-10-CM

## 2024-01-17 DIAGNOSIS — R74.8 ELEVATED LIVER ENZYMES: ICD-10-CM

## 2024-01-17 DIAGNOSIS — G25.81 RESTLESS LEGS: ICD-10-CM

## 2024-01-17 DIAGNOSIS — E66.3 OVERWEIGHT (BMI 25.0-29.9): ICD-10-CM

## 2024-01-17 RX ORDER — PHENTERMINE HYDROCHLORIDE 37.5 MG/1
37.5 TABLET ORAL
Qty: 30 TABLET | Refills: 0 | Status: SHIPPED | OUTPATIENT
Start: 2024-01-17

## 2024-01-17 RX ORDER — PRAMIPEXOLE DIHYDROCHLORIDE 0.5 MG/1
0.5 TABLET ORAL NIGHTLY
Qty: 90 TABLET | Refills: 1 | Status: SHIPPED | OUTPATIENT
Start: 2024-01-17

## 2024-01-17 NOTE — PROGRESS NOTES
"Chief Complaint  Obesity (Phentermine #2, starting 169, total loss 9lbs)    Subjective         Barb Clinton presents to [facility]  Patient presents to the office for 1 month follow-up regarding her weight management.  Patient has lost 9 pounds since her last visit.  She denies any adverse side effects of the medication and would like to continue this regimen.  I did review recent lab work with the patient.  I did go over this with her and discuss checking more labs in 1 month due to her elevated liver enzymes.  I explained that this could be medication driven or could be something else.  I explained that we would confirm with the retesting.  She also had a low TSH.  She states there is a family history of hypothyroidism.  I explained again we would recheck this level with her next appointment.  It was also noted that she had hypercalcemia.  I did explain that we would check a parathyroid hormone level.  Patient blood pressure is 120/70.  She denies any chest pain shortness breath palpitations at this time.    Obesity         Objective     Vitals:    01/17/24 1027   BP: 128/70   BP Location: Right arm   Patient Position: Sitting   Cuff Size: Adult   Pulse: 103   Temp: 97.3 °F (36.3 °C)   TempSrc: Temporal   SpO2: 99%   Weight: 72.9 kg (160 lb 12.8 oz)   Height: 165.1 cm (65\")      Body mass index is 26.76 kg/m².             Physical Exam  Vitals reviewed.   Constitutional:       Appearance: Normal appearance.   Cardiovascular:      Rate and Rhythm: Normal rate and regular rhythm.      Pulses: Normal pulses.      Heart sounds: Normal heart sounds, S1 normal and S2 normal. No murmur heard.  Pulmonary:      Effort: Pulmonary effort is normal. No respiratory distress.      Breath sounds: Normal breath sounds.   Skin:     General: Skin is warm and dry.   Neurological:      Mental Status: She is alert and oriented to person, place, and time.   Psychiatric:         Attention and " Perception: Attention normal.         Mood and Affect: Mood normal.         Behavior: Behavior normal.          Result Review :   The following data was reviewed by: MICHAELA Newby on 01/17/2024:      Procedures    Assessment and Plan   Diagnoses and all orders for this visit:    1. Hypercalcemia (Primary)  -     Comprehensive metabolic panel; Future  -     PTH, Intact; Future    2. Overweight (BMI 25.0-29.9)  -     phentermine (ADIPEX-P) 37.5 MG tablet; Take 1 tablet by mouth Every Morning Before Breakfast.  Dispense: 30 tablet; Refill: 0    3. Nutritional counseling  -     phentermine (ADIPEX-P) 37.5 MG tablet; Take 1 tablet by mouth Every Morning Before Breakfast.  Dispense: 30 tablet; Refill: 0    4. Restless legs  -     pramipexole (MIRAPEX) 0.5 MG tablet; Take 1 tablet by mouth Every Night.  Dispense: 90 tablet; Refill: 1    5. Elevated liver enzymes  -     Comprehensive metabolic panel; Future    6. Low TSH level  -     TSH+Free T4; Future          Follow Up   Return in about 1 month (around 2/17/2024).  Patient was given instructions and counseling regarding her condition or for health maintenance advice. Please see specific information pulled into the AVS if appropriate.       Answers submitted by the patient for this visit:  Other (Submitted on 1/15/2024)  Please describe your symptoms.: Follow up on weight loss  Have you had these symptoms before?: No  How long have you been having these symptoms?: Greater than 2 weeks  Please list any medications you are currently taking for this condition.: Phentermine tab 37.5mg  Please describe any probable cause for these symptoms. : None  Primary Reason for Visit (Submitted on 1/15/2024)  What is the primary reason for your visit?: Other

## 2024-02-14 ENCOUNTER — LAB (OUTPATIENT)
Dept: LAB | Facility: HOSPITAL | Age: 59
End: 2024-02-14
Payer: COMMERCIAL

## 2024-02-14 DIAGNOSIS — R79.89 LOW TSH LEVEL: ICD-10-CM

## 2024-02-14 DIAGNOSIS — R74.8 ELEVATED LIVER ENZYMES: ICD-10-CM

## 2024-02-14 DIAGNOSIS — E83.52 HYPERCALCEMIA: ICD-10-CM

## 2024-02-14 LAB
ALBUMIN SERPL-MCNC: 4.8 G/DL (ref 3.5–5.2)
ALBUMIN/GLOB SERPL: 2 G/DL
ALP SERPL-CCNC: 102 U/L (ref 39–117)
ALT SERPL W P-5'-P-CCNC: 25 U/L (ref 1–33)
ANION GAP SERPL CALCULATED.3IONS-SCNC: 11 MMOL/L (ref 5–15)
AST SERPL-CCNC: 28 U/L (ref 1–32)
BILIRUB SERPL-MCNC: 0.3 MG/DL (ref 0–1.2)
BUN SERPL-MCNC: 20 MG/DL (ref 6–20)
BUN/CREAT SERPL: 16.8 (ref 7–25)
CALCIUM SPEC-SCNC: 10.8 MG/DL (ref 8.6–10.5)
CHLORIDE SERPL-SCNC: 100 MMOL/L (ref 98–107)
CO2 SERPL-SCNC: 25 MMOL/L (ref 22–29)
CREAT SERPL-MCNC: 1.19 MG/DL (ref 0.57–1)
EGFRCR SERPLBLD CKD-EPI 2021: 53.1 ML/MIN/1.73
GLOBULIN UR ELPH-MCNC: 2.4 GM/DL
GLUCOSE SERPL-MCNC: 86 MG/DL (ref 65–99)
POTASSIUM SERPL-SCNC: 4.9 MMOL/L (ref 3.5–5.2)
PROT SERPL-MCNC: 7.2 G/DL (ref 6–8.5)
PTH-INTACT SERPL-MCNC: 43 PG/ML (ref 15–65)
SODIUM SERPL-SCNC: 136 MMOL/L (ref 136–145)
T4 FREE SERPL-MCNC: 1.5 NG/DL (ref 0.93–1.7)
TSH SERPL DL<=0.05 MIU/L-ACNC: 0.01 UIU/ML (ref 0.27–4.2)

## 2024-02-14 PROCEDURE — 84439 ASSAY OF FREE THYROXINE: CPT

## 2024-02-14 PROCEDURE — 80053 COMPREHEN METABOLIC PANEL: CPT

## 2024-02-14 PROCEDURE — 83970 ASSAY OF PARATHORMONE: CPT

## 2024-02-14 PROCEDURE — 84443 ASSAY THYROID STIM HORMONE: CPT

## 2024-02-14 PROCEDURE — 36415 COLL VENOUS BLD VENIPUNCTURE: CPT

## 2024-02-16 ENCOUNTER — OFFICE VISIT (OUTPATIENT)
Dept: FAMILY MEDICINE CLINIC | Facility: CLINIC | Age: 59
End: 2024-02-16
Payer: COMMERCIAL

## 2024-02-16 VITALS
TEMPERATURE: 96.3 F | HEART RATE: 103 BPM | OXYGEN SATURATION: 99 % | BODY MASS INDEX: 25.39 KG/M2 | SYSTOLIC BLOOD PRESSURE: 116 MMHG | HEIGHT: 65 IN | WEIGHT: 152.4 LBS | DIASTOLIC BLOOD PRESSURE: 74 MMHG

## 2024-02-16 DIAGNOSIS — E66.3 OVERWEIGHT (BMI 25.0-29.9): ICD-10-CM

## 2024-02-16 DIAGNOSIS — Z71.3 NUTRITIONAL COUNSELING: ICD-10-CM

## 2024-02-16 DIAGNOSIS — E05.90 HYPERTHYROIDISM: Primary | ICD-10-CM

## 2024-02-16 RX ORDER — PHENTERMINE HYDROCHLORIDE 37.5 MG/1
37.5 TABLET ORAL
Qty: 30 TABLET | Refills: 0 | Status: SHIPPED | OUTPATIENT
Start: 2024-02-16

## 2024-03-13 ENCOUNTER — OFFICE VISIT (OUTPATIENT)
Dept: FAMILY MEDICINE CLINIC | Facility: CLINIC | Age: 59
End: 2024-03-13
Payer: COMMERCIAL

## 2024-03-13 VITALS
WEIGHT: 151.6 LBS | TEMPERATURE: 97.6 F | BODY MASS INDEX: 25.26 KG/M2 | DIASTOLIC BLOOD PRESSURE: 72 MMHG | OXYGEN SATURATION: 99 % | HEIGHT: 65 IN | SYSTOLIC BLOOD PRESSURE: 122 MMHG | HEART RATE: 93 BPM

## 2024-03-13 DIAGNOSIS — E78.5 HYPERLIPIDEMIA, UNSPECIFIED HYPERLIPIDEMIA TYPE: ICD-10-CM

## 2024-03-13 DIAGNOSIS — Z71.3 NUTRITIONAL COUNSELING: Primary | ICD-10-CM

## 2024-03-13 RX ORDER — ROSUVASTATIN CALCIUM 20 MG/1
20 TABLET, COATED ORAL DAILY
Qty: 90 TABLET | Refills: 1 | Status: SHIPPED | OUTPATIENT
Start: 2024-03-13

## 2024-03-13 NOTE — PROGRESS NOTES
"Chief Complaint  Obesity (Phentermine #4, starting weight 169, total loss 18lbs)    Subjective         Barb Clinton presents to [facility]  Presents to the office today for a follow-up regarding her weight management.  Patient lost 1 pound since her last visit.  I did discuss discontinuing the phentermine.  She is currently down 18 pounds from when she started this process.  She states that she was able to stop her blood pressure medication.  Blood pressure today is 122/72.  States that she feels better than she ever has.  She states that she is going to continue to watch her diet and continue activity.  She denies any other concerns or complaints at this time    Obesity         Objective     Vitals:    03/13/24 0918   BP: 122/72   BP Location: Right arm   Patient Position: Sitting   Cuff Size: Adult   Pulse: 93   Temp: 97.6 °F (36.4 °C)   TempSrc: Temporal   SpO2: 99%   Weight: 68.8 kg (151 lb 9.6 oz)   Height: 165.1 cm (65\")      Body mass index is 25.23 kg/m².             Physical Exam  Vitals reviewed.   Constitutional:       Appearance: Normal appearance.   Cardiovascular:      Rate and Rhythm: Normal rate and regular rhythm.      Pulses: Normal pulses.      Heart sounds: Normal heart sounds, S1 normal and S2 normal. No murmur heard.  Pulmonary:      Effort: Pulmonary effort is normal. No respiratory distress.      Breath sounds: Normal breath sounds.   Skin:     General: Skin is warm and dry.   Neurological:      Mental Status: She is alert and oriented to person, place, and time.   Psychiatric:         Attention and Perception: Attention normal.         Mood and Affect: Mood normal.         Behavior: Behavior normal.          Result Review :   The following data was reviewed by: MICHAELA Newby on 03/13/2024:      Procedures    Assessment and Plan   Diagnoses and all orders for this visit:    1. Nutritional counseling (Primary)    2. Hyperlipidemia, unspecified " hyperlipidemia type  -     rosuvastatin (Crestor) 20 MG tablet; Take 1 tablet by mouth Daily.  Dispense: 90 tablet; Refill: 1      She will continue a low-carb low sugar diet.  She continues to be active and to monitor her weight.  I did discuss increasing her water intake.  She verbalized understanding    Follow Up   No follow-ups on file.  Patient was given instructions and counseling regarding her condition or for health maintenance advice. Please see specific information pulled into the AVS if appropriate.

## 2024-03-22 ENCOUNTER — OFFICE VISIT (OUTPATIENT)
Dept: ENDOCRINOLOGY | Age: 59
End: 2024-03-22
Payer: COMMERCIAL

## 2024-03-22 VITALS
TEMPERATURE: 96.9 F | HEART RATE: 79 BPM | WEIGHT: 152.4 LBS | OXYGEN SATURATION: 100 % | SYSTOLIC BLOOD PRESSURE: 142 MMHG | HEIGHT: 65 IN | DIASTOLIC BLOOD PRESSURE: 86 MMHG | BODY MASS INDEX: 25.39 KG/M2

## 2024-03-22 DIAGNOSIS — E05.90 SUBCLINICAL HYPERTHYROIDISM: Primary | ICD-10-CM

## 2024-03-22 RX ORDER — PROPRANOLOL HYDROCHLORIDE 10 MG/1
10 TABLET ORAL 2 TIMES DAILY
Qty: 60 TABLET | Refills: 3 | Status: SHIPPED | OUTPATIENT
Start: 2024-03-22

## 2024-03-22 NOTE — PROGRESS NOTES
"Chief Complaint  Chief Complaint   Patient presents with    Hyperthyroidism     Pt states that energy levels have been good, states that she has lots of energy, weight is stable, does have family hx of thyroid disease(mother and aunt).       Subjective          History of Present Illness    Barb Clinton 58 y.o. presents for a follow-up evaluation of Hyperthyroidism    Referring Provider: MICHAELA Newby    Pt's mother is with her today at this visit    Thyroid labs were checked 01/24 during an annual physical and showed low TSH at 0.162.  Repeat labs a month later in 02/24 showed low TSH with normal FT4    Family history of thyroid disease: mother (hyperthyroid) and aunt (hypothyroid), daughter (hypothyroid)  Family history of thyroid cancer: denies      She complains of increase of sweating, increase in energy, dry eye/mouth, tremors, insomnia, weight lost and \"harder stools\"    Denies complaints of fatigue, double vision, diarrhea, chest pain, shortness of breath, palpitations, cold intolerance, trouble swallowing or change in voice.    Use of biotin: Denies  Current or past use of amiodarone: Denies    Smoker: Past smoker quit in 2017      Menopause in early to mid 40s.      Last labs in 02/24 showed TSH 0.012, FT4 1.5          I have reviewed the patient's allergies, medicines, past medical hx, family hx and social hx.    Objective   Vital Signs:   /86   Pulse 79   Temp 96.9 °F (36.1 °C) (Temporal)   Ht 165.1 cm (65\")   Wt 69.1 kg (152 lb 6.4 oz)   SpO2 100%   BMI 25.36 kg/m²       Physical Exam   Physical Exam  Constitutional:       General: She is not in acute distress.     Appearance: Normal appearance. She is not ill-appearing.   HENT:      Head: Normocephalic and atraumatic.   Eyes:      General:         Right eye: No discharge.         Left eye: No discharge.   Neck:      Thyroid: No thyroid mass, thyromegaly or thyroid tenderness.      Trachea: Trachea normal.   Cardiovascular:      Rate " and Rhythm: Normal rate and regular rhythm.      Heart sounds: Normal heart sounds. No murmur heard.     No friction rub. No gallop.   Pulmonary:      Effort: Pulmonary effort is normal. No respiratory distress.      Breath sounds: Normal breath sounds. No wheezing.   Musculoskeletal:      Cervical back: Neck supple.   Skin:     General: Skin is warm and dry.   Neurological:      Mental Status: She is alert.   Psychiatric:         Mood and Affect: Mood normal.         Behavior: Behavior normal.                    Results Review:   TSH   Date Value Ref Range Status   02/14/2024 0.012 (L) 0.270 - 4.200 uIU/mL Final         Assessment and Plan    Diagnoses and all orders for this visit:    1. Subclinical hyperthyroidism (Primary)  -     propranolol (INDERAL) 10 MG tablet; Take 1 tablet by mouth 2 (Two) Times a Day.  Dispense: 60 tablet; Refill: 3  -     TSH  -     T4, Free  -     T3  -     Thyroid Stimulating Immunoglobulin  -     Thyrotropin Receptor Antibody      Pt has subclinical hyperthyroidism, but is symptomatic  Discussed Grave's disease vs Toxic nodule(s) as possible cause of overactive thyroid disease and the differences between them  Discussed treatment options: 1) anti-thyroid medications - methimazole and PTU - possible side effects (like, but not limited to GI upset, rash, hepatotoxicity, leukopenia). 2) radioactive iodine treatment - this would render her with hypothyroidism requiring life long replacement therapy. 3) total thyroidectomy - this would also render her with hypothyroidism requiring life long replacement therapy.  Pt's WBC noted normal in 01/24  Pt's LFTs have been noted to be up and down.  Last check in 02/24 showed normal LFTs.  Pt states that she does not know why they have been elevated in the past.  This may be an issue if anti-thyroid medications are needed.  Pt is a past smoker and states issue with dry eye - advised for an eye exam to make sure she does not have Grave's disease of  the eye as if she wants to do PATIÑO, PATIÑO may make it worse.  Will start on low dose propranolol to help with some of the symptoms  Check labs today - will check antibodies for Grave's disease with these labs          Labs today  RTC in 4 months with me      Follow Up     Patient was given instructions and counseling regarding her condition or for health maintenance advice. Please see specific information pulled into the AVS if appropriate.              Coni Dobbins, MICHAELA  03/22/24

## 2024-03-26 DIAGNOSIS — E05.90 SUBCLINICAL HYPERTHYROIDISM: Primary | ICD-10-CM

## 2024-03-26 LAB
T3 SERPL-MCNC: 163 NG/DL (ref 71–180)
T4 FREE SERPL-MCNC: 1.32 NG/DL (ref 0.82–1.77)
TSH RECEP AB SER-ACNC: 1.51 IU/L (ref 0–1.75)
TSH SERPL DL<=0.005 MIU/L-ACNC: 0.01 UIU/ML (ref 0.45–4.5)
TSI SER-ACNC: 0.5 IU/L (ref 0–0.55)

## 2024-03-28 ENCOUNTER — TELEPHONE (OUTPATIENT)
Dept: ENDOCRINOLOGY | Age: 59
End: 2024-03-28
Payer: COMMERCIAL

## 2024-03-28 NOTE — TELEPHONE ENCOUNTER
PT called and stated that she stopped taking the INDERAL 10 MG tablet today because for the past couple of days it has caused her headaches, nausea, diarrhea, and body aches.    Please advise.

## 2024-03-28 NOTE — TELEPHONE ENCOUNTER
Did it help with any of her symptoms? If not then continue off.  If it did then we could try another beta blocker to see if it helps with symptoms, but hopefully not give her the same side effects.

## 2024-03-28 NOTE — TELEPHONE ENCOUNTER
"Called and spoke with pt. Pt stated that the medication did not seem to help with any current symptoms- \"just created more symptoms\".  "

## 2024-04-29 ENCOUNTER — HOSPITAL ENCOUNTER (OUTPATIENT)
Dept: NUCLEAR MEDICINE | Facility: HOSPITAL | Age: 59
Discharge: HOME | End: 2024-04-29
Admitting: NURSE PRACTITIONER
Payer: COMMERCIAL

## 2024-04-29 DIAGNOSIS — E05.90 SUBCLINICAL HYPERTHYROIDISM: ICD-10-CM

## 2024-04-29 PROCEDURE — 0 SODIUM IODIDE 3.7 MBQ CAPSULE: Performed by: NURSE PRACTITIONER

## 2024-04-29 PROCEDURE — 78014 THYROID IMAGING W/BLOOD FLOW: CPT

## 2024-04-29 PROCEDURE — A9516 IODINE I-123 SOD IODIDE MIC: HCPCS | Performed by: NURSE PRACTITIONER

## 2024-04-29 RX ORDER — SODIUM IODIDE I 123 100 UCI/1
1 CAPSULE, GELATIN COATED ORAL
Status: COMPLETED | OUTPATIENT
Start: 2024-04-29 | End: 2024-04-29

## 2024-04-29 RX ADMIN — SODIUM IODIDE I 123 1 CAPSULE: 100 CAPSULE, GELATIN COATED ORAL at 14:00

## 2024-04-30 ENCOUNTER — HOSPITAL ENCOUNTER (OUTPATIENT)
Dept: NUCLEAR MEDICINE | Facility: HOSPITAL | Age: 59
Discharge: HOME | End: 2024-04-30
Payer: COMMERCIAL

## 2024-05-01 DIAGNOSIS — E05.90 SUBCLINICAL HYPERTHYROIDISM: Primary | ICD-10-CM

## 2024-05-02 ENCOUNTER — LAB (OUTPATIENT)
Dept: LAB | Facility: HOSPITAL | Age: 59
End: 2024-05-02
Payer: COMMERCIAL

## 2024-05-02 DIAGNOSIS — E05.90 SUBCLINICAL HYPERTHYROIDISM: ICD-10-CM

## 2024-05-02 PROCEDURE — 84480 ASSAY TRIIODOTHYRONINE (T3): CPT

## 2024-05-02 PROCEDURE — 84439 ASSAY OF FREE THYROXINE: CPT

## 2024-05-02 PROCEDURE — 36415 COLL VENOUS BLD VENIPUNCTURE: CPT

## 2024-05-02 PROCEDURE — 84443 ASSAY THYROID STIM HORMONE: CPT

## 2024-05-04 LAB
T3 SERPL-MCNC: 153 NG/DL (ref 71–180)
T4 FREE SERPL-MCNC: 1.19 NG/DL (ref 0.82–1.77)
TSH SERPL DL<=0.005 MIU/L-ACNC: 0.05 UIU/ML (ref 0.45–4.5)

## 2024-05-06 DIAGNOSIS — E05.90 SUBCLINICAL HYPERTHYROIDISM: Primary | ICD-10-CM

## 2024-05-06 RX ORDER — METHIMAZOLE 5 MG/1
5 TABLET ORAL DAILY
Qty: 30 TABLET | Refills: 1 | Status: SHIPPED | OUTPATIENT
Start: 2024-05-06

## 2024-06-04 ENCOUNTER — LAB (OUTPATIENT)
Dept: LAB | Facility: HOSPITAL | Age: 59
End: 2024-06-04
Payer: COMMERCIAL

## 2024-06-04 PROCEDURE — 84443 ASSAY THYROID STIM HORMONE: CPT | Performed by: NURSE PRACTITIONER

## 2024-06-04 PROCEDURE — 84480 ASSAY TRIIODOTHYRONINE (T3): CPT | Performed by: NURSE PRACTITIONER

## 2024-06-04 PROCEDURE — 80053 COMPREHEN METABOLIC PANEL: CPT | Performed by: NURSE PRACTITIONER

## 2024-06-04 PROCEDURE — 84439 ASSAY OF FREE THYROXINE: CPT | Performed by: NURSE PRACTITIONER

## 2024-06-04 PROCEDURE — 85025 COMPLETE CBC W/AUTO DIFF WBC: CPT | Performed by: NURSE PRACTITIONER

## 2024-06-06 ENCOUNTER — TELEPHONE (OUTPATIENT)
Dept: ENDOCRINOLOGY | Age: 59
End: 2024-06-06
Payer: COMMERCIAL

## 2024-06-06 ENCOUNTER — TELEPHONE (OUTPATIENT)
Dept: ENDOCRINOLOGY | Age: 59
End: 2024-06-06

## 2024-06-06 NOTE — TELEPHONE ENCOUNTER
Name: Oz Barb L      Relationship: Self      Best Callback Number: [phone]       HUB PROVIDED THE RELAY MESSAGE FROM THE OFFICE (6/4/24 LAB RESULTS)    PATIENT: VOICED UNDERSTANDING AND HAS NO FURTHER QUESTIONS AT THIS TIME

## 2024-06-06 NOTE — TELEPHONE ENCOUNTER
----- Message from Coni Dobbins sent at 6/6/2024 12:00 PM EDT -----  TSH remains low, but better.  FT4 is normal, although lower side of normal.    Normal white blood cell count and liver function    Continue with methimazole 5 mg daily as I don't want to lower your FT4 levels any lower    Called and lm for pt to call reg labs   Ok for hub to read to patient   Please schedule labs if needed or follow ups  Ok for  to read to patient   Please schedule labs if needed or follow ups

## 2024-06-06 NOTE — TELEPHONE ENCOUNTER
----- Message from Coni Dobbins sent at 6/6/2024 12:00 PM EDT -----  TSH remains low, but better.  FT4 is normal, although lower side of normal.    Normal white blood cell count and liver function    Continue with methimazole 5 mg daily as I don't want to lower your FT4 levels any lower

## 2024-06-17 ENCOUNTER — CLINICAL SUPPORT (OUTPATIENT)
Dept: FAMILY MEDICINE CLINIC | Facility: CLINIC | Age: 59
End: 2024-06-17
Payer: COMMERCIAL

## 2024-06-17 DIAGNOSIS — Z11.1 PPD SCREENING TEST: Primary | ICD-10-CM

## 2024-06-17 PROCEDURE — 86580 TB INTRADERMAL TEST: CPT | Performed by: NURSE PRACTITIONER

## 2024-06-20 ENCOUNTER — CLINICAL SUPPORT (OUTPATIENT)
Dept: FAMILY MEDICINE CLINIC | Facility: CLINIC | Age: 59
End: 2024-06-20
Payer: COMMERCIAL

## 2024-06-20 DIAGNOSIS — Z11.1 VISIT FOR TB SKIN TEST: Primary | ICD-10-CM

## 2024-06-20 LAB
INDURATION: 0 MM (ref 0–10)
Lab: NORMAL
Lab: NORMAL
TB SKIN TEST: NEGATIVE

## 2024-06-24 DIAGNOSIS — G25.81 RESTLESS LEGS: ICD-10-CM

## 2024-06-24 RX ORDER — PRAMIPEXOLE DIHYDROCHLORIDE 0.5 MG/1
0.5 TABLET ORAL
Qty: 90 TABLET | Refills: 1 | Status: SHIPPED | OUTPATIENT
Start: 2024-06-24

## 2024-07-09 RX ORDER — METHIMAZOLE 5 MG/1
5 TABLET ORAL DAILY
Qty: 30 TABLET | Refills: 1 | Status: SHIPPED | OUTPATIENT
Start: 2024-07-09

## 2024-07-09 NOTE — TELEPHONE ENCOUNTER
Rx Refill Note  Requested Prescriptions     Pending Prescriptions Disp Refills    methIMAzole (TAPAZOLE) 5 MG tablet 30 tablet 1     Sig: Take 1 tablet by mouth Daily. Take with food      Last office visit with prescribing clinician: 3/22/2024   Last telemedicine visit with prescribing clinician: Visit date not found   Next office visit with prescribing clinician: 7/22/2024                         Would you like a call back once the refill request has been completed: [] Yes [] No    If the office needs to give you a call back, can they leave a voicemail: [] Yes [] No    Kathrin Naik  07/09/24, 09:17 EDT

## 2024-07-22 ENCOUNTER — OFFICE VISIT (OUTPATIENT)
Dept: ENDOCRINOLOGY | Age: 59
End: 2024-07-22
Payer: COMMERCIAL

## 2024-07-22 VITALS
DIASTOLIC BLOOD PRESSURE: 70 MMHG | BODY MASS INDEX: 27.86 KG/M2 | TEMPERATURE: 97.1 F | HEIGHT: 65 IN | SYSTOLIC BLOOD PRESSURE: 124 MMHG | OXYGEN SATURATION: 97 % | WEIGHT: 167.2 LBS | HEART RATE: 87 BPM

## 2024-07-22 DIAGNOSIS — E05.90 SUBCLINICAL HYPERTHYROIDISM: Primary | ICD-10-CM

## 2024-07-22 PROCEDURE — 99213 OFFICE O/P EST LOW 20 MIN: CPT | Performed by: NURSE PRACTITIONER

## 2024-07-22 NOTE — PROGRESS NOTES
"Chief Complaint  Chief Complaint   Patient presents with    Hyperthyroidism     Pt states that energy levels have been good, weight is higher than expected, does family hx of thyroid disease.        Subjective          History of Present Illness    Barb Clinton 59 y.o. presents for a follow-up evaluation of Hyperthyroidism       Thyroid labs were checked 01/24 during an annual physical and showed low TSH at 0.162.  Repeat labs a month later in 02/24 showed low TSH with normal FT4     Family history of thyroid disease: mother (hyperthyroid) and aunt (hypothyroid), daughter (hypothyroid)  Family history of thyroid cancer: denies        She complains of increase of sweating, increase in energy, dry mouth/skin, insomnia and constipation    Denies complaints of dry eye, double vision, tremors, diarrhea, chest pain, shortness of breath, palpitations and cold intolerance.      Use of biotin: Denies  Current or past use of amiodarone: Denies    Smoker: Past smoker quit in 2017     Weight gain of 15 lbs since last visit.     Menopause in early to mid 40s.      Current treatment is methimazole 5 mg daily  Pt did not tolerate propranolol due to side effects      Last labs in 06/24 showed TSH 0.238, FT4 0.83 and TT3 133    TSI and TRAB negative 03/24  Up take scan in 04/24 was normal - Most likely you have Grave's disease with negative antibodies.           I have reviewed the patient's allergies, medicines, past medical hx, family hx and social hx.    Objective   Vital Signs:   /70   Pulse 87   Temp 97.1 °F (36.2 °C) (Temporal)   Ht 165.1 cm (65\")   Wt 75.8 kg (167 lb 3.2 oz)   SpO2 97%   BMI 27.82 kg/m²       Physical Exam   Physical Exam  Constitutional:       General: She is not in acute distress.     Appearance: Normal appearance. She is not diaphoretic.   HENT:      Head: Normocephalic and atraumatic.   Eyes:      General:         Right eye: No discharge.         Left eye: No discharge.   Skin:     General: " Skin is warm and dry.   Neurological:      Mental Status: She is alert.   Psychiatric:         Mood and Affect: Mood normal.         Behavior: Behavior normal.                    Results Review:   TSH   Date Value Ref Range Status   06/04/2024 0.238 (L) 0.450 - 4.500 uIU/mL Final   02/14/2024 0.012 (L) 0.270 - 4.200 uIU/mL Final     T3, Total   Date Value Ref Range Status   06/04/2024 133 71 - 180 ng/dL Final         Assessment and Plan    Diagnoses and all orders for this visit:    1. Subclinical hyperthyroidism (Primary)  -     TSH  -     T4, Free  -     T3      Pt states that she still has some symptoms (increase energy, insomnia and sweating), but where her TSH is I am questioning as to whether or not these are being caused by her subclinical hyperthyroidism  Pt states tremors are gone  Continue with methimazole 5 mg daily  Check labs today        Labs today  RTC in 4 months with me      Follow Up     Patient was given instructions and counseling regarding her condition or for health maintenance advice. Please see specific information pulled into the AVS if appropriate.              Coni Dobbins, MICHAELA  07/22/24

## 2024-07-23 ENCOUNTER — TELEPHONE (OUTPATIENT)
Dept: ENDOCRINOLOGY | Age: 59
End: 2024-07-23
Payer: COMMERCIAL

## 2024-07-23 DIAGNOSIS — E05.90 SUBCLINICAL HYPERTHYROIDISM: Primary | ICD-10-CM

## 2024-07-23 LAB
T3 SERPL-MCNC: 122 NG/DL (ref 80–200)
T4 FREE SERPL-MCNC: 0.88 NG/DL (ref 0.92–1.68)
TSH SERPL DL<=0.005 MIU/L-ACNC: 2.35 UIU/ML (ref 0.27–4.2)

## 2024-07-23 NOTE — TELEPHONE ENCOUNTER
Left pt a message to return call.    OKAY FOR HUB AND  TO READ RESULTS.      TSH is good, but FT4 is low - stop taking methimazole.  Where your labs current are I do not believe your symptoms of sweating, increase in energy and insomnia are related to your thryoid, but your symptoms of dry mouth/skin and constipation may be related.  Recheck labs in 4-6 weeks.  Orders placed

## 2024-07-23 NOTE — TELEPHONE ENCOUNTER
Called and spoke with pt regarding lab results. Pt stated that she is going to be getting her labs done at the [facility] facility in [facility].

## 2024-07-26 DIAGNOSIS — M54.2 CERVICAL PAIN (NECK): ICD-10-CM

## 2024-07-26 RX ORDER — CYCLOBENZAPRINE HCL 10 MG
10 TABLET ORAL
Qty: 90 TABLET | Refills: 1 | Status: SHIPPED | OUTPATIENT
Start: 2024-07-26

## 2024-09-06 ENCOUNTER — LAB (OUTPATIENT)
Dept: LAB | Facility: HOSPITAL | Age: 59
End: 2024-09-06
Payer: COMMERCIAL

## 2024-09-06 PROCEDURE — 84443 ASSAY THYROID STIM HORMONE: CPT | Performed by: NURSE PRACTITIONER

## 2024-09-06 PROCEDURE — 84439 ASSAY OF FREE THYROXINE: CPT | Performed by: NURSE PRACTITIONER

## 2024-09-06 PROCEDURE — 84480 ASSAY TRIIODOTHYRONINE (T3): CPT | Performed by: NURSE PRACTITIONER

## 2024-09-13 ENCOUNTER — OFFICE VISIT (OUTPATIENT)
Dept: FAMILY MEDICINE CLINIC | Facility: CLINIC | Age: 59
End: 2024-09-13
Payer: COMMERCIAL

## 2024-09-13 VITALS
TEMPERATURE: 96.1 F | DIASTOLIC BLOOD PRESSURE: 80 MMHG | OXYGEN SATURATION: 98 % | HEART RATE: 107 BPM | BODY MASS INDEX: 26.79 KG/M2 | WEIGHT: 160.8 LBS | HEIGHT: 65 IN | SYSTOLIC BLOOD PRESSURE: 126 MMHG

## 2024-09-13 DIAGNOSIS — G25.81 RESTLESS LEGS: ICD-10-CM

## 2024-09-13 DIAGNOSIS — M19.90 OSTEOARTHRITIS, UNSPECIFIED OSTEOARTHRITIS TYPE, UNSPECIFIED SITE: ICD-10-CM

## 2024-09-13 DIAGNOSIS — M54.2 CERVICAL PAIN (NECK): ICD-10-CM

## 2024-09-13 DIAGNOSIS — Z12.11 SCREENING FOR COLON CANCER: Primary | ICD-10-CM

## 2024-09-13 DIAGNOSIS — F17.211 CIGARETTE NICOTINE DEPENDENCE IN REMISSION: ICD-10-CM

## 2024-09-13 PROCEDURE — 99214 OFFICE O/P EST MOD 30 MIN: CPT | Performed by: NURSE PRACTITIONER

## 2024-09-13 RX ORDER — DICLOFENAC SODIUM 75 MG/1
75 TABLET, DELAYED RELEASE ORAL 2 TIMES DAILY
Qty: 180 TABLET | Refills: 0 | Status: SHIPPED | OUTPATIENT
Start: 2024-09-13

## 2024-09-13 RX ORDER — PRAMIPEXOLE DIHYDROCHLORIDE 0.5 MG/1
0.5 TABLET ORAL
Qty: 90 TABLET | Refills: 1 | Status: SHIPPED | OUTPATIENT
Start: 2024-09-13

## 2024-09-13 RX ORDER — CYCLOBENZAPRINE HCL 10 MG
10 TABLET ORAL
Qty: 90 TABLET | Refills: 1 | Status: SHIPPED | OUTPATIENT
Start: 2024-09-13

## 2024-09-13 NOTE — PROGRESS NOTES
"Chief Complaint  Arthritis (6 month follow up)    Subjective         Barb Clinton presents to [facility]  Patient presents 6-month follow-up.  Patient states that she is doing well although that she is suffering from arthritis in her hands.  I did explain that I would put her on anti-inflammatory that she can take twice a day that would not cause any other side effects.  She verbalized understanding.  She is due for her Cologuard screening as well as her low-dose CT screening.  Patient does have a history of a 40 pack year smoker.  Pressure is 126/80.  She denies any chest pain shortness of breath palpitations this time.  She does state that she is needing refills on her medications.    Arthritis         Objective     Vitals:    09/13/24 0925   BP: 126/80   BP Location: Right arm   Patient Position: Sitting   Cuff Size: Adult   Pulse: 107   Temp: 96.1 °F (35.6 °C)   TempSrc: Temporal   SpO2: 98%   Weight: 72.9 kg (160 lb 12.8 oz)   Height: 165.1 cm (65\")      Body mass index is 26.76 kg/m².             Physical Exam  Vitals reviewed.   Constitutional:       Appearance: Normal appearance.   HENT:      Head: Normocephalic and atraumatic.   Cardiovascular:      Rate and Rhythm: Normal rate and regular rhythm.      Pulses: Normal pulses.      Heart sounds: Normal heart sounds.   Pulmonary:      Effort: Pulmonary effort is normal.      Breath sounds: Normal breath sounds.   Musculoskeletal:         General: Normal range of motion.      Cervical back: Normal range of motion and neck supple.   Skin:     General: Skin is warm and dry.   Neurological:      General: No focal deficit present.      Mental Status: She is alert and oriented to person, place, and time.   Psychiatric:         Mood and Affect: Mood normal.         Behavior: Behavior normal.          Result Review :   The following data was reviewed by: MICHAELA Newby on 09/13/2024:    Procedures    Assessment and Plan "   Diagnoses and all orders for this visit:    1. Screening for colon cancer (Primary)  -     Cologuard - Stool, Per Rectum; Future    2. Cervical pain (neck)  -     cyclobenzaprine (FLEXERIL) 10 MG tablet; Take 1 tablet by mouth every night at bedtime.  Dispense: 90 tablet; Refill: 1    3. Osteoarthritis, unspecified osteoarthritis type, unspecified site  -     diclofenac (VOLTAREN) 75 MG EC tablet; Take 1 tablet by mouth 2 (Two) Times a Day.  Dispense: 180 tablet; Refill: 0    4. Restless legs  -     pramipexole (MIRAPEX) 0.5 MG tablet; Take 1 tablet by mouth every night at bedtime.  Dispense: 90 tablet; Refill: 1    5. Cigarette nicotine dependence in remission  -     CT Chest Low Dose Wo; Future          Follow Up   Return in about 6 months (around 3/13/2025) for Recheck.  Patient was given instructions and counseling regarding her condition or for health maintenance advice. Please see specific information pulled into the AVS if appropriate.

## 2024-09-24 DIAGNOSIS — R19.5 POSITIVE COLORECTAL CANCER SCREENING USING COLOGUARD TEST: Primary | ICD-10-CM

## 2024-09-25 ENCOUNTER — HOSPITAL ENCOUNTER (OUTPATIENT)
Dept: CT IMAGING | Facility: HOSPITAL | Age: 59
Discharge: HOME | End: 2024-09-25
Admitting: NURSE PRACTITIONER
Payer: COMMERCIAL

## 2024-09-25 DIAGNOSIS — F17.211 CIGARETTE NICOTINE DEPENDENCE IN REMISSION: ICD-10-CM

## 2024-09-25 PROCEDURE — 71271 CT THORAX LUNG CANCER SCR C-: CPT

## 2024-10-23 ENCOUNTER — PREP FOR SURGERY (OUTPATIENT)
Dept: OTHER | Facility: HOSPITAL | Age: 59
End: 2024-10-23
Payer: COMMERCIAL

## 2024-10-23 ENCOUNTER — TELEPHONE (OUTPATIENT)
Dept: GASTROENTEROLOGY | Facility: CLINIC | Age: 59
End: 2024-10-23
Payer: COMMERCIAL

## 2024-10-23 DIAGNOSIS — R19.5 POSITIVE COLORECTAL CANCER SCREENING USING COLOGUARD TEST: Primary | ICD-10-CM

## 2024-10-23 NOTE — TELEPHONE ENCOUNTER
POSITIVE COLOGUARD    NO PERSONAL HX OF POLYPS     FAMILY HX OF POLYPS     FAMILY HX OF COLON CA    NO ASA OR BLOOD THINNERS              LIST OF  MEDICATIONS  VOLTARAN   MIRAPEX  FLEXERIL        OA QUESTIONNAIRE SCANNED IN MEDIA

## 2024-10-24 ENCOUNTER — TELEPHONE (OUTPATIENT)
Dept: GASTROENTEROLOGY | Facility: CLINIC | Age: 59
End: 2024-10-24
Payer: COMMERCIAL

## 2024-10-24 NOTE — TELEPHONE ENCOUNTER
[facility] - FOR [facility] WITH PROVIDER - [facility] WILL CALL WITH ARRIVE TIME A WEEK PRIOR - DATE IS   COLON   12/20/2024

## 2024-11-22 ENCOUNTER — OFFICE VISIT (OUTPATIENT)
Dept: ENDOCRINOLOGY | Age: 59
End: 2024-11-22
Payer: COMMERCIAL

## 2024-11-22 VITALS
BODY MASS INDEX: 27.52 KG/M2 | TEMPERATURE: 97.5 F | HEIGHT: 65 IN | HEART RATE: 79 BPM | DIASTOLIC BLOOD PRESSURE: 86 MMHG | OXYGEN SATURATION: 98 % | WEIGHT: 165.2 LBS | SYSTOLIC BLOOD PRESSURE: 124 MMHG

## 2024-11-22 DIAGNOSIS — E05.90 SUBCLINICAL HYPERTHYROIDISM: Primary | ICD-10-CM

## 2024-11-22 NOTE — PROGRESS NOTES
"Chief Complaint  Chief Complaint   Patient presents with    Hyperthyroidism     Pt states that energy levels have been good, weight is higher than expected, does have family hx of thyroid disease(mother, aunt, daughter).        Subjective          History of Present Illness    Barb Clinton 59 y.o. presents for a follow-up evaluation of Hyperthyroidism        Thyroid labs were checked 01/24 during an annual physical and showed low TSH at 0.162.  Repeat labs a month later in 02/24 showed low TSH with normal FT4     Family history of thyroid disease: mother (hyperthyroid) and aunt (hypothyroid), daughter (hypothyroid)  Family history of thyroid cancer: denies        She complains of dry skin, insomnia and constipation    Pt states that her energy levels are back to her normal - she has always had high energy levels    Denies complaints of dry eye, double vision, tremors, diarrhea, hair loss, chest pain, shortness of breath, palpitations, heat intolerance and cold intolerance      Use of biotin: Denies  Current or past use of amiodarone: Denies    Smoker: Past smoker quit in 2017     Weight loss of 2 lbs since last visit.     Menopause in early to mid 40s.        Current treatment is nothing - monitoring labs  Methimazole 5 mg daily stopped due to low FT4 levels  Pt did not tolerate propranolol due to side effects      Last labs in 09/24 showed TSH 1.080, FT4 0.89      TSI and TRAB negative 03/24  Up take scan in 04/24 was normal - Most likely you have Grave's disease with negative antibodies          I have reviewed the patient's allergies, medicines, past medical hx, family hx and social hx.    Objective   Vital Signs:   /86   Pulse 79   Temp 97.5 °F (36.4 °C) (Oral)   Ht 165.1 cm (65\")   Wt 74.9 kg (165 lb 3.2 oz)   SpO2 98%   BMI 27.49 kg/m²       Physical Exam   Physical Exam  Constitutional:       General: She is not in acute distress.     Appearance: Normal appearance. She is not diaphoretic. "   HENT:      Head: Normocephalic and atraumatic.   Eyes:      General:         Right eye: No discharge.         Left eye: No discharge.   Skin:     General: Skin is warm and dry.   Neurological:      Mental Status: She is alert.   Psychiatric:         Mood and Affect: Mood normal.         Behavior: Behavior normal.                    Results Review:   TSH   Date Value Ref Range Status   09/06/2024 1.080 0.450 - 4.500 uIU/mL Final   02/14/2024 0.012 (L) 0.270 - 4.200 uIU/mL Final     T3, Total   Date Value Ref Range Status   07/22/2024 122.0 80.0 - 200.0 ng/dl Final     Comment:     Results may be falsely increased if patient taking Biotin.         Assessment and Plan    Diagnoses and all orders for this visit:    1. Subclinical hyperthyroidism (Primary)  -     TSH  -     T4, Free    Pt states that overall she feels back to her normal self  Pt has been off methimazole  Will recheck labs today  Advised that if normal then will continue off and just monitor labs  If have to start back on methimazole then would start back at 2.5 mg daily        Labs today  RTC in 4 months with me      Follow Up     Patient was given instructions and counseling regarding her condition or for health maintenance advice. Please see specific information pulled into the AVS if appropriate.              Coni Dobbins, APRN  11/22/24

## 2024-11-23 LAB
T4 FREE SERPL-MCNC: 1.28 NG/DL (ref 0.92–1.68)
TSH SERPL DL<=0.005 MIU/L-ACNC: 0.52 UIU/ML (ref 0.27–4.2)

## 2024-12-20 ENCOUNTER — OUTSIDE FACILITY SERVICE (OUTPATIENT)
Dept: GASTROENTEROLOGY | Facility: CLINIC | Age: 59
End: 2024-12-20
Payer: COMMERCIAL

## 2024-12-20 ENCOUNTER — LAB REQUISITION (OUTPATIENT)
Dept: LAB | Facility: HOSPITAL | Age: 59
End: 2024-12-20
Payer: COMMERCIAL

## 2024-12-20 DIAGNOSIS — K63.5 POLYP OF COLON: ICD-10-CM

## 2024-12-20 PROCEDURE — 88305 TISSUE EXAM BY PATHOLOGIST: CPT | Performed by: INTERNAL MEDICINE

## 2024-12-23 LAB
CYTO UR: NORMAL
LAB AP CASE REPORT: NORMAL
LAB AP DIAGNOSIS COMMENT: NORMAL
PATH REPORT.FINAL DX SPEC: NORMAL
PATH REPORT.GROSS SPEC: NORMAL

## 2024-12-30 ENCOUNTER — TELEPHONE (OUTPATIENT)
Dept: GASTROENTEROLOGY | Facility: CLINIC | Age: 59
End: 2024-12-30
Payer: COMMERCIAL

## 2024-12-31 ENCOUNTER — TELEPHONE (OUTPATIENT)
Dept: GASTROENTEROLOGY | Facility: CLINIC | Age: 59
End: 2024-12-31

## 2024-12-31 DIAGNOSIS — D12.0 ADENOMA OF CECUM: Primary | ICD-10-CM

## 2024-12-31 NOTE — TELEPHONE ENCOUNTER
Hub staff attempted to follow warm transfer process and was unsuccessful     Caller: Barb Clinton    Relationship to patient: Self    Best call back number: [phone]    Patient is needing: RETURNING MISSED CALL FROM DR LUCAS/RESULTS. PLEASE CONTACT PT TO ASSIST.

## 2025-01-09 DIAGNOSIS — G25.81 RESTLESS LEGS: ICD-10-CM

## 2025-01-09 RX ORDER — PRAMIPEXOLE DIHYDROCHLORIDE 0.5 MG/1
0.5 TABLET ORAL
Qty: 90 TABLET | Refills: 1 | Status: SHIPPED | OUTPATIENT
Start: 2025-01-09

## 2025-01-29 ENCOUNTER — OFFICE VISIT (OUTPATIENT)
Dept: SURGERY | Facility: CLINIC | Age: 60
End: 2025-01-29
Payer: COMMERCIAL

## 2025-01-29 VITALS
SYSTOLIC BLOOD PRESSURE: 132 MMHG | DIASTOLIC BLOOD PRESSURE: 88 MMHG | HEIGHT: 65 IN | WEIGHT: 176 LBS | HEART RATE: 88 BPM | OXYGEN SATURATION: 96 % | BODY MASS INDEX: 29.32 KG/M2

## 2025-01-29 DIAGNOSIS — D12.2 ADENOMATOUS POLYP OF ASCENDING COLON: Primary | ICD-10-CM

## 2025-01-29 RX ORDER — GABAPENTIN 300 MG/1
600 CAPSULE ORAL ONCE
OUTPATIENT
Start: 2025-02-11

## 2025-01-29 RX ORDER — SCOPOLAMINE 1 MG/3D
1 PATCH, EXTENDED RELEASE TRANSDERMAL CONTINUOUS
OUTPATIENT
Start: 2025-02-11 | End: 2025-02-14

## 2025-01-29 RX ORDER — METRONIDAZOLE 500 MG/1
TABLET ORAL
Qty: 2 TABLET | Refills: 0 | Status: SHIPPED | OUTPATIENT
Start: 2025-01-29

## 2025-01-29 RX ORDER — METRONIDAZOLE 500 MG/100ML
500 INJECTION, SOLUTION INTRAVENOUS ONCE
OUTPATIENT
Start: 2025-02-11 | End: 2025-01-29

## 2025-01-29 RX ORDER — CHLORHEXIDINE GLUCONATE 40 MG/ML
1 SOLUTION TOPICAL 2 TIMES DAILY
Qty: 236 ML | Refills: 0 | Status: SHIPPED | OUTPATIENT
Start: 2025-01-29

## 2025-01-29 RX ORDER — ACETAMINOPHEN 500 MG
1000 TABLET ORAL ONCE
OUTPATIENT
Start: 2025-02-11 | End: 2025-01-29

## 2025-01-29 RX ORDER — CELECOXIB 200 MG/1
200 CAPSULE ORAL ONCE
OUTPATIENT
Start: 2025-02-11 | End: 2025-01-29

## 2025-01-29 RX ORDER — CIPROFLOXACIN 500 MG/1
TABLET, FILM COATED ORAL
Qty: 1 TABLET | Refills: 0 | Status: SHIPPED | OUTPATIENT
Start: 2025-01-29

## 2025-01-29 RX ORDER — ALVIMOPAN 12 MG/1
12 CAPSULE ORAL ONCE
OUTPATIENT
Start: 2025-02-11 | End: 2025-01-29

## 2025-01-29 NOTE — H&P (VIEW-ONLY)
Barb Clinton is a 59 y.o. female who is seen as a consult at the request of Dr. Dawson for evaluation of colon polyp    HPI:  History of Present Illness  The patient is a 59-year-old female who underwent a colonoscopy several weeks ago and was found to have a very flat polyp in the cecum. She also had one at the hepatic flexure. The flat polyp in the cecum could not be removed but was biopsied and came back as tubular adenoma. The hepatic flexure polyp also came back as tubular adenoma. She comes to discuss surgical options.    She reports experiencing intermittent sharp, stabbing pain for approximately one year, which she has been attributing to gas pains. She expresses a desire to expedite the surgical process due to her active lifestyle, which includes frequent motorcycle riding with her .    She also mentions the presence of four clamps and seeks clarification on the duration she is required to carry the associated card.    MEDICATIONS  Past: gabapentin       Past Medical History:   Diagnosis Date    Arthritis     DDD NECK LOWER LUMBAR SPINE, DDD T SPINE. PAIN IN HANDS    Asthma     Colon polyp 12/20/24    Family history of colon cancer     FATHER    Hypercholesterolemia     MEDS IN THE PAST. NO CURRENTLY    Hypertension     HISTORY OF..HAS NOT NEEDED MEDICINES RECENTLY    Restless leg     Subclinical hyperthyroidism 03/22/2024    Vitamin D deficiency        Past Surgical History:   Procedure Laterality Date    COLONOSCOPY      ENDOSCOPY      MULTIPLE TOOTH EXTRACTIONS      COMPLETE UPPER AND LOWER       Social History:   reports that she quit smoking about 7 years ago. Her smoking use included cigarettes. She started smoking about 46 years ago. She has a 39.7 pack-year smoking history. She has never been exposed to tobacco smoke. She has never used smokeless tobacco. She reports current alcohol use of about 2.0 standard drinks of alcohol per week. She reports that she does not use drugs.      Marriage  status:     Family History   Problem Relation Age of Onset    Thyroid disease Mother     Hypertension Mother     Kidney disease Mother     Asthma Mother     Cancer Father          on 10/29/2002 with Colon Cancer    Asthma Daughter     Asthma Son     Diabetes Son     Learning disabilities Son     Mental retardation Son     Thyroid disease Maternal Aunt     Arthritis Maternal Uncle     Arthritis Maternal Grandmother     Malig Hyperthermia Neg Hx          Current Outpatient Medications:     albuterol sulfate  (90 Base) MCG/ACT inhaler, Inhale 2 puffs Every 4 (Four) Hours As Needed for Wheezing. (Patient taking differently: Inhale 2 puffs Every 4 (Four) Hours As Needed for Wheezing (RESCUE INHALER).), Disp: 18 g, Rfl: 5    cyclobenzaprine (FLEXERIL) 10 MG tablet, Take 1 tablet by mouth every night at bedtime., Disp: 90 tablet, Rfl: 1    diclofenac (VOLTAREN) 75 MG EC tablet, Take 1 tablet by mouth 2 (Two) Times a Day. (Patient taking differently: Take 1 tablet by mouth 2 (Two) Times a Day. WILL HOLD FOR SURGERY), Disp: 180 tablet, Rfl: 0    pramipexole (MIRAPEX) 0.5 MG tablet, TAKE 1 TABLET BY MOUTH AT NIGHT, Disp: 90 tablet, Rfl: 1    Chlorhexidine Gluconate 4 % solution, Apply 1 Application topically to the appropriate area as directed 2 (Two) Times a Day. Shower With Hibiclens Solution Twice The Day Before Surgery, Disp: 236 mL, Rfl: 0    ciprofloxacin (CIPRO) 500 MG tablet, Take at 8 pm the day before surgery (Patient taking differently: See Admin Instructions. Take at 8 pm the day before surgery), Disp: 1 tablet, Rfl: 0    metroNIDAZOLE (FLAGYL) 500 MG tablet, Take 1 pill at 8 pm and then at 10 pm the day before surgery (Patient taking differently: See Admin Instructions. Take 1 pill at 8 pm and then at 10 pm the day before surgery), Disp: 2 tablet, Rfl: 0    Allergy  Patient has no known allergies.      Vitals:    25 1326   BP: 132/88   Pulse: 88   SpO2: 96%     Body mass index is 29.29  kg/m².      Physical Exam    Physical Exam  Constitutional:       General: She is not in acute distress.     Appearance: She is well-developed.   HENT:      Head: Normocephalic and atraumatic.   Abdominal:      General: There is no distension.      Palpations: Abdomen is soft.   Musculoskeletal:         General: Normal range of motion.   Neurological:      Mental Status: She is alert.   Psychiatric:         Thought Content: Thought content normal.         Review of Medical Record:  I reviewed medical records as detailed in HPI.     Assessment & Plan  1. Tubular adenoma.  The patient underwent a colonoscopy several weeks ago, which revealed a very flat polyp in the cecum and another at the hepatic flexure. Both polyps were biopsied and came back as tubular adenomas. The cecal polyp could not be removed during the colonoscopy. A detailed discussion was held regarding the nature of polyps, their potential for malignancy, and the associated risks. The patient was informed that tubular adenomas are the lowest risk for turning into cancer. Given the size of the polyp (less than 1 cm), there is a 10-15% chance of cancerous changes. A partial right colectomy, also known as a cecectomy, was recommended. This procedure involves removing the cecum, appendix, and a small portion of the small intestine, followed by reattachment of the small intestine to the right colon. The surgery will be performed laparoscopically using the [employer] robot and is expected to take 2-3 hours. The patient will need to stay in the hospital for 1-2 nights, depending on recovery. Post-operative care includes an abdominal wall block for pain management, followed by a cocktail of medications including Tylenol, Celebrex, and gabapentin. The patient was advised to avoid driving and lifting anything heavier than 15 pounds for the first two weeks post-surgery. She was also instructed to avoid forcing food intake and to remain active by sitting up in a  chair and walking around the house four times daily. The patient was informed about the low risk of infection (less than 10%) and the rare need for an ostomy bag (less than 2%). She was advised to contact the office immediately if she experiences fever, increased pain, or drainage from the incision site. Two antibiotics will be prescribed to be taken during the prep day before surgery.         1. Adenomatous polyp of ascending colon          Patient or patient representative verbalized consent for the use of Ambient Listening during the visit with  Marizol Valladares MD for chart documentation. 2/5/2025  12:32 EST

## 2025-01-30 ENCOUNTER — PRE-ADMISSION TESTING (OUTPATIENT)
Dept: PREADMISSION TESTING | Facility: HOSPITAL | Age: 60
End: 2025-01-30
Payer: COMMERCIAL

## 2025-01-30 VITALS
RESPIRATION RATE: 16 BRPM | BODY MASS INDEX: 28.92 KG/M2 | HEART RATE: 76 BPM | OXYGEN SATURATION: 99 % | HEIGHT: 65 IN | TEMPERATURE: 98.1 F | WEIGHT: 173.6 LBS | DIASTOLIC BLOOD PRESSURE: 83 MMHG | SYSTOLIC BLOOD PRESSURE: 144 MMHG

## 2025-01-30 DIAGNOSIS — D12.2 ADENOMATOUS POLYP OF ASCENDING COLON: ICD-10-CM

## 2025-01-30 LAB
ABO GROUP BLD: NORMAL
ANION GAP SERPL CALCULATED.3IONS-SCNC: 7.7 MMOL/L (ref 5–15)
BLD GP AB SCN SERPL QL: NEGATIVE
BUN SERPL-MCNC: 19 MG/DL (ref 6–20)
BUN/CREAT SERPL: 20.7 (ref 7–25)
CALCIUM SPEC-SCNC: 10 MG/DL (ref 8.6–10.5)
CHLORIDE SERPL-SCNC: 103 MMOL/L (ref 98–107)
CO2 SERPL-SCNC: 28.3 MMOL/L (ref 22–29)
CREAT SERPL-MCNC: 0.92 MG/DL (ref 0.57–1)
DEPRECATED RDW RBC AUTO: 37.3 FL (ref 37–54)
EGFRCR SERPLBLD CKD-EPI 2021: 71.9 ML/MIN/1.73
ERYTHROCYTE [DISTWIDTH] IN BLOOD BY AUTOMATED COUNT: 12.6 % (ref 12.3–15.4)
GLUCOSE SERPL-MCNC: 98 MG/DL (ref 65–99)
HCT VFR BLD AUTO: 40.1 % (ref 34–46.6)
HGB BLD-MCNC: 13.1 G/DL (ref 12–15.9)
MCH RBC QN AUTO: 26.8 PG (ref 26.6–33)
MCHC RBC AUTO-ENTMCNC: 32.7 G/DL (ref 31.5–35.7)
MCV RBC AUTO: 82 FL (ref 79–97)
PLATELET # BLD AUTO: 232 10*3/MM3 (ref 140–450)
PMV BLD AUTO: 9.7 FL (ref 6–12)
POTASSIUM SERPL-SCNC: 5.4 MMOL/L (ref 3.5–5.2)
RBC # BLD AUTO: 4.89 10*6/MM3 (ref 3.77–5.28)
RH BLD: POSITIVE
SODIUM SERPL-SCNC: 139 MMOL/L (ref 136–145)
T&S EXPIRATION DATE: NORMAL
WBC NRBC COR # BLD AUTO: 5.73 10*3/MM3 (ref 3.4–10.8)

## 2025-01-30 PROCEDURE — 86900 BLOOD TYPING SEROLOGIC ABO: CPT

## 2025-01-30 PROCEDURE — 36415 COLL VENOUS BLD VENIPUNCTURE: CPT

## 2025-01-30 PROCEDURE — 93005 ELECTROCARDIOGRAM TRACING: CPT

## 2025-01-30 PROCEDURE — 85027 COMPLETE CBC AUTOMATED: CPT

## 2025-01-30 PROCEDURE — 80048 BASIC METABOLIC PNL TOTAL CA: CPT

## 2025-01-30 PROCEDURE — 86901 BLOOD TYPING SEROLOGIC RH(D): CPT

## 2025-01-30 PROCEDURE — 86850 RBC ANTIBODY SCREEN: CPT

## 2025-01-30 NOTE — DISCHARGE INSTRUCTIONS
Take the following medications the morning of surgery: NONE. HOLD DICLOFENAC AS DIRECTED      If you are on prescription narcotic pain medication to control your pain you may also take that medication the morning of surgery.    General Instructions:    Follow your surgeons instructions regarding when to stop solid foods and when to stop liquids.   Verify with your surgeon if you are to complete a bowel prep and when to do so.  Bring any papers given to you in the doctor’s office.  Wear clean comfortable clothes.  Do not wear contact lenses, false eyelashes or make-up.  Bring a case for your glasses.   Remove all piercings.  Leave jewelry and any other valuables at home.  The Pre-Admission Testing nurse will instruct you to bring medications if unable to obtain an accurate list in Pre-Admission Testing.        If you were given a blood bank ID arm band remember to bring it with you the day of surgery.    Preventing a Surgical Site Infection:  For 2 to 3 days before surgery, avoid shaving with a razor because the razor can irritate skin and make it easier to develop an infection.    Any areas of open skin can increase the risk of a post-operative wound infection by allowing bacteria to enter and travel throughout the body.  Notify your surgeon if you have any skin wounds / rashes even if it is not near the expected surgical site.  The area will need assessed to determine if surgery should be delayed until it is healed.  The night prior to surgery shower using a fresh bar of anti-bacterial soap (such as Dial) and clean washcloth.  Sleep in a clean bed with clean clothing.  Do not allow pets to sleep with you.  Shower on the morning of surgery using a fresh bar of anti-bacterial soap (such as Dial) and clean washcloth.  Dry with a clean towel and dress in clean clothing.  Ask your surgeon if you will be receiving antibiotics prior to surgery.  Make sure you, your family, and all healthcare providers clean their hands  with soap and water or an alcohol based hand  before caring for you or your wound.    Day of surgery:  Your arrival time is approximately two hours before your scheduled surgery time.  Upon arrival, a Pre-op nurse and Anesthesiologist will review your health history, obtain vital signs, and answer questions you may have.  The only belongings needed at this time will be a list of your home medications and if applicable your C-PAP/BI-PAP machine.  A Pre-op nurse will start an IV and you may receive medication in preparation for surgery, including something to help you relax.     Please be aware that surgery does come with discomfort.  We want to make every effort to control your discomfort so please discuss any uncontrolled symptoms with your nurse.   Your doctor will most likely have prescribed pain medications.      If you are going home after surgery you will receive individualized written care instructions before being discharged.  A responsible adult must drive you to and from the hospital on the day of your surgery and stay with you for 24 hours.  Discharge prescriptions can be filled by the hospital pharmacy during regular pharmacy hours.  If you are having surgery late in the day/evening your prescription may be e-prescribed to your pharmacy.  Please verify your pharmacy hours or chose a 24 hour pharmacy to avoid not having access to your prescription because your pharmacy has closed for the day.    If you are staying overnight following surgery, you will be transported to your hospital room following the recovery period.  [facility] has all private rooms.    If you have any questions please call Pre-Admission Testing at [phone].  Deductibles and co-payments are collected on the day of service. Please be prepared to pay the required co-pay, deductible or deposit on the day of service as defined by your plan.        CHLORHEXIDINE CLOTH INSTRUCTIONS  The morning of surgery follow  these instructions using the Chlorhexidine cloths you've been given.  These steps reduce bacteria on the body.  Do not use the cloths near your eyes, ears mouth, genitalia or on open wounds.  Throw the cloths away after use but do not try to flush them down a toilet.      Open and remove one cloth at a time from the package.    Leave the cloth unfolded and begin the bathing.  Massage the skin with the cloths using gentle pressure to remove bacteria.  Do not scrub harshly.   Follow the steps below with one 2% CHG cloth per area (6 total cloths).  One cloth for neck, shoulders and chest.  One cloth for both arms, hands, fingers and underarms (do underarms last).  One cloth for the abdomen followed by groin.  One cloth for right leg and foot including between the toes.  One cloth for left leg and foot including between the toes.  The last cloth is to be used for the back of the neck, back and buttocks.    Allow the CHG to air dry 3 minutes on the skin which will give it time to work and decrease the chance of irritation.  The skin may feel sticky until it is dry.  Do not rinse with water or any other liquid or you will lose the beneficial effects of the CHG.  If mild skin irritation occurs, do rinse the skin to remove the CHG.  Report this to the nurse at time of admission.  Do not apply lotions, creams, ointments, deodorants or perfumes after using the clothes. Dress in clean clothes before coming to the hospital.    Call your surgeon immediately if you experience any of the following symptoms:  Sore Throat  Shortness of Breath or difficulty breathing  Cough  Chills  Body soreness or muscle pain  Headache  Fever  New loss of taste or smell  Do not arrive for your surgery ill.  Your procedure will need to be rescheduled to another time.  You will need to call your physician before the day of surgery to avoid any unnecessary exposure to hospital staff as well as other patients.

## 2025-02-01 LAB
QT INTERVAL: 406 MS
QTC INTERVAL: 413 MS

## 2025-02-06 DIAGNOSIS — M19.90 OSTEOARTHRITIS, UNSPECIFIED OSTEOARTHRITIS TYPE, UNSPECIFIED SITE: ICD-10-CM

## 2025-02-06 NOTE — TELEPHONE ENCOUNTER
Upcoming Appts  With Family Medicine (MICHAELA Newby)  03/14/2025 at 9:30 AM  Last Office Visit - This Dept  9/13/2024 Keaton Snow APRN

## 2025-02-07 RX ORDER — DICLOFENAC SODIUM 75 MG/1
75 TABLET, DELAYED RELEASE ORAL 2 TIMES DAILY
Qty: 180 TABLET | Refills: 0 | Status: SHIPPED | OUTPATIENT
Start: 2025-02-07

## 2025-02-11 ENCOUNTER — ANESTHESIA (OUTPATIENT)
Dept: PERIOP | Facility: HOSPITAL | Age: 60
End: 2025-02-11
Payer: COMMERCIAL

## 2025-02-11 ENCOUNTER — HOSPITAL ENCOUNTER (INPATIENT)
Facility: HOSPITAL | Age: 60
LOS: 1 days | Discharge: HOME | End: 2025-02-12
Attending: COLON & RECTAL SURGERY | Admitting: COLON & RECTAL SURGERY
Payer: COMMERCIAL

## 2025-02-11 ENCOUNTER — ANESTHESIA EVENT (OUTPATIENT)
Dept: PERIOP | Facility: HOSPITAL | Age: 60
End: 2025-02-11
Payer: COMMERCIAL

## 2025-02-11 DIAGNOSIS — D12.2 ADENOMATOUS POLYP OF ASCENDING COLON: ICD-10-CM

## 2025-02-11 PROBLEM — K63.5 COLON POLYP: Status: ACTIVE | Noted: 2025-02-11

## 2025-02-11 LAB
INR PPP: 1.08 (ref 0.9–1.1)
PROTHROMBIN TIME: 13.9 SECONDS (ref 11.7–14.2)

## 2025-02-11 PROCEDURE — 25010000002 METRONIDAZOLE 500 MG/100ML SOLUTION: Performed by: COLON & RECTAL SURGERY

## 2025-02-11 PROCEDURE — 25010000002 HYDROMORPHONE PER 4 MG

## 2025-02-11 PROCEDURE — 85610 PROTHROMBIN TIME: CPT | Performed by: COLON & RECTAL SURGERY

## 2025-02-11 PROCEDURE — 25010000002 HYDROMORPHONE PER 4 MG: Performed by: COLON & RECTAL SURGERY

## 2025-02-11 PROCEDURE — 25810000003 LACTATED RINGERS PER 1000 ML: Performed by: STUDENT IN AN ORGANIZED HEALTH CARE EDUCATION/TRAINING PROGRAM

## 2025-02-11 PROCEDURE — 25010000002 DEXAMETHASONE PER 1 MG

## 2025-02-11 PROCEDURE — 25010000002 LIDOCAINE 2% SOLUTION

## 2025-02-11 PROCEDURE — 25010000002 PROPOFOL 10 MG/ML EMULSION

## 2025-02-11 PROCEDURE — 25010000002 BUPIVACAINE LIPOSOME 1.3 % SUSPENSION: Performed by: ANESTHESIOLOGY

## 2025-02-11 PROCEDURE — 88309 TISSUE EXAM BY PATHOLOGIST: CPT | Performed by: COLON & RECTAL SURGERY

## 2025-02-11 PROCEDURE — 25010000002 BUPIVACAINE LIPOSOME 1.3 % SUSPENSION

## 2025-02-11 PROCEDURE — 25010000002 LIDOCAINE PER 10 MG

## 2025-02-11 PROCEDURE — 25010000002 GLYCOPYRROLATE 0.2 MG/ML SOLUTION

## 2025-02-11 PROCEDURE — 25010000002 BUPIVACAINE (PF) 0.25 % SOLUTION: Performed by: ANESTHESIOLOGY

## 2025-02-11 PROCEDURE — 25010000002 ACETAMINOPHEN 10 MG/ML SOLUTION: Performed by: COLON & RECTAL SURGERY

## 2025-02-11 PROCEDURE — 8E0W4CZ ROBOTIC ASSISTED PROCEDURE OF TRUNK REGION, PERCUTANEOUS ENDOSCOPIC APPROACH: ICD-10-PCS | Performed by: COLON & RECTAL SURGERY

## 2025-02-11 PROCEDURE — S2900 ROBOTIC SURGICAL SYSTEM: HCPCS | Performed by: COLON & RECTAL SURGERY

## 2025-02-11 PROCEDURE — 44205 LAP COLECTOMY PART W/ILEUM: CPT | Performed by: PHYSICIAN ASSISTANT

## 2025-02-11 PROCEDURE — 44205 LAP COLECTOMY PART W/ILEUM: CPT | Performed by: COLON & RECTAL SURGERY

## 2025-02-11 PROCEDURE — 25010000002 ONDANSETRON PER 1 MG: Performed by: COLON & RECTAL SURGERY

## 2025-02-11 PROCEDURE — 0DTF4ZZ RESECTION OF RIGHT LARGE INTESTINE, PERCUTANEOUS ENDOSCOPIC APPROACH: ICD-10-PCS | Performed by: COLON & RECTAL SURGERY

## 2025-02-11 PROCEDURE — 25010000002 INDOCYANINE GREEN 25 MG RECONSTITUTED SOLUTION

## 2025-02-11 PROCEDURE — 25010000002 ONDANSETRON PER 1 MG

## 2025-02-11 PROCEDURE — 25010000002 PHENYLEPHRINE 10 MG/ML SOLUTION

## 2025-02-11 PROCEDURE — 25810000003 SODIUM CHLORIDE 0.9 % SOLUTION: Performed by: COLON & RECTAL SURGERY

## 2025-02-11 PROCEDURE — 25010000002 CEFAZOLIN PER 500 MG: Performed by: COLON & RECTAL SURGERY

## 2025-02-11 PROCEDURE — 25010000002 MAGNESIUM SULFATE PER 500 MG OF MAGNESIUM

## 2025-02-11 PROCEDURE — 25010000002 SUGAMMADEX 200 MG/2ML SOLUTION

## 2025-02-11 DEVICE — KNOTLESS TISSUE CONTROL DEVICE, VIOLET UNIDIRECTIONAL (ANTIBACTERIAL) SYNTHETIC ABSORBABLE DEVICE
Type: IMPLANTABLE DEVICE | Site: COLON | Status: FUNCTIONAL
Brand: STRATAFIX

## 2025-02-11 DEVICE — STAPLER 60 RELOAD BLUE
Type: IMPLANTABLE DEVICE | Site: COLON | Status: FUNCTIONAL
Brand: SUREFORM

## 2025-02-11 RX ORDER — PROPOFOL 10 MG/ML
VIAL (ML) INTRAVENOUS AS NEEDED
Status: DISCONTINUED | OUTPATIENT
Start: 2025-02-11 | End: 2025-02-11 | Stop reason: SURG

## 2025-02-11 RX ORDER — FAMOTIDINE 10 MG/ML
20 INJECTION, SOLUTION INTRAVENOUS ONCE
Status: COMPLETED | OUTPATIENT
Start: 2025-02-11 | End: 2025-02-11

## 2025-02-11 RX ORDER — HYDROCODONE BITARTRATE AND ACETAMINOPHEN 5; 325 MG/1; MG/1
1 TABLET ORAL ONCE AS NEEDED
Status: CANCELLED | OUTPATIENT
Start: 2025-02-11

## 2025-02-11 RX ORDER — ENOXAPARIN SODIUM 100 MG/ML
40 INJECTION SUBCUTANEOUS DAILY
Status: DISCONTINUED | OUTPATIENT
Start: 2025-02-12 | End: 2025-02-12 | Stop reason: HOSPADM

## 2025-02-11 RX ORDER — PROMETHAZINE HYDROCHLORIDE 25 MG/1
25 SUPPOSITORY RECTAL ONCE AS NEEDED
Status: CANCELLED | OUTPATIENT
Start: 2025-02-11

## 2025-02-11 RX ORDER — BUPIVACAINE HYDROCHLORIDE 2.5 MG/ML
INJECTION, SOLUTION EPIDURAL; INFILTRATION; INTRACAUDAL
Status: COMPLETED | OUTPATIENT
Start: 2025-02-11 | End: 2025-02-11

## 2025-02-11 RX ORDER — SODIUM CHLORIDE 9 MG/ML
50 INJECTION, SOLUTION INTRAVENOUS CONTINUOUS
Status: ACTIVE | OUTPATIENT
Start: 2025-02-11 | End: 2025-02-12

## 2025-02-11 RX ORDER — ATROPINE SULFATE 0.4 MG/ML
0.4 INJECTION, SOLUTION INTRAMUSCULAR; INTRAVENOUS; SUBCUTANEOUS ONCE AS NEEDED
Status: CANCELLED | OUTPATIENT
Start: 2025-02-11

## 2025-02-11 RX ORDER — MAGNESIUM SULFATE HEPTAHYDRATE 500 MG/ML
INJECTION, SOLUTION INTRAMUSCULAR; INTRAVENOUS AS NEEDED
Status: DISCONTINUED | OUTPATIENT
Start: 2025-02-11 | End: 2025-02-11 | Stop reason: SURG

## 2025-02-11 RX ORDER — SODIUM CHLORIDE, SODIUM LACTATE, POTASSIUM CHLORIDE, CALCIUM CHLORIDE 600; 310; 30; 20 MG/100ML; MG/100ML; MG/100ML; MG/100ML
9 INJECTION, SOLUTION INTRAVENOUS CONTINUOUS
Status: DISCONTINUED | OUTPATIENT
Start: 2025-02-11 | End: 2025-02-11

## 2025-02-11 RX ORDER — GABAPENTIN 400 MG/1
400 CAPSULE ORAL EVERY 8 HOURS SCHEDULED
Status: DISCONTINUED | OUTPATIENT
Start: 2025-02-11 | End: 2025-02-12 | Stop reason: HOSPADM

## 2025-02-11 RX ORDER — SODIUM CHLORIDE 0.9 % (FLUSH) 0.9 %
3 SYRINGE (ML) INJECTION EVERY 12 HOURS SCHEDULED
Status: DISCONTINUED | OUTPATIENT
Start: 2025-02-11 | End: 2025-02-11 | Stop reason: HOSPADM

## 2025-02-11 RX ORDER — LIDOCAINE HYDROCHLORIDE 10 MG/ML
0.5 INJECTION, SOLUTION INFILTRATION; PERINEURAL ONCE AS NEEDED
Status: DISCONTINUED | OUTPATIENT
Start: 2025-02-11 | End: 2025-02-11 | Stop reason: HOSPADM

## 2025-02-11 RX ORDER — ONDANSETRON 2 MG/ML
4 INJECTION INTRAMUSCULAR; INTRAVENOUS ONCE AS NEEDED
Status: DISCONTINUED | OUTPATIENT
Start: 2025-02-11 | End: 2025-02-11 | Stop reason: HOSPADM

## 2025-02-11 RX ORDER — OXYCODONE HYDROCHLORIDE 5 MG/1
5 TABLET ORAL ONCE AS NEEDED
Status: DISCONTINUED | OUTPATIENT
Start: 2025-02-11 | End: 2025-02-11 | Stop reason: HOSPADM

## 2025-02-11 RX ORDER — FLUMAZENIL 0.1 MG/ML
0.2 INJECTION INTRAVENOUS AS NEEDED
Status: CANCELLED | OUTPATIENT
Start: 2025-02-11

## 2025-02-11 RX ORDER — NALOXONE HCL 0.4 MG/ML
0.4 VIAL (ML) INJECTION
Status: DISCONTINUED | OUTPATIENT
Start: 2025-02-11 | End: 2025-02-11 | Stop reason: HOSPADM

## 2025-02-11 RX ORDER — KETAMINE HCL IN NACL, ISO-OSM 100MG/10ML
10 SYRINGE (ML) INJECTION
Status: DISCONTINUED | OUTPATIENT
Start: 2025-02-11 | End: 2025-02-11 | Stop reason: HOSPADM

## 2025-02-11 RX ORDER — FENTANYL CITRATE 50 UG/ML
50 INJECTION, SOLUTION INTRAMUSCULAR; INTRAVENOUS
Status: CANCELLED | OUTPATIENT
Start: 2025-02-11

## 2025-02-11 RX ORDER — DEXAMETHASONE SODIUM PHOSPHATE 4 MG/ML
INJECTION, SOLUTION INTRA-ARTICULAR; INTRALESIONAL; INTRAMUSCULAR; INTRAVENOUS; SOFT TISSUE AS NEEDED
Status: DISCONTINUED | OUTPATIENT
Start: 2025-02-11 | End: 2025-02-11 | Stop reason: SURG

## 2025-02-11 RX ORDER — GABAPENTIN 300 MG/1
600 CAPSULE ORAL ONCE
Status: COMPLETED | OUTPATIENT
Start: 2025-02-11 | End: 2025-02-11

## 2025-02-11 RX ORDER — HYDRALAZINE HYDROCHLORIDE 20 MG/ML
5 INJECTION INTRAMUSCULAR; INTRAVENOUS
Status: DISCONTINUED | OUTPATIENT
Start: 2025-02-11 | End: 2025-02-11 | Stop reason: HOSPADM

## 2025-02-11 RX ORDER — KETAMINE HCL IN NACL, ISO-OSM 100MG/10ML
SYRINGE (ML) INJECTION AS NEEDED
Status: DISCONTINUED | OUTPATIENT
Start: 2025-02-11 | End: 2025-02-11 | Stop reason: SURG

## 2025-02-11 RX ORDER — PRAMIPEXOLE DIHYDROCHLORIDE 0.5 MG/1
0.5 TABLET ORAL NIGHTLY
Status: DISCONTINUED | OUTPATIENT
Start: 2025-02-11 | End: 2025-02-12 | Stop reason: HOSPADM

## 2025-02-11 RX ORDER — METRONIDAZOLE 500 MG/100ML
500 INJECTION, SOLUTION INTRAVENOUS ONCE
Status: COMPLETED | OUTPATIENT
Start: 2025-02-11 | End: 2025-02-11

## 2025-02-11 RX ORDER — DIPHENHYDRAMINE HCL 25 MG
25 CAPSULE ORAL EVERY 4 HOURS PRN
Status: DISCONTINUED | OUTPATIENT
Start: 2025-02-11 | End: 2025-02-11 | Stop reason: HOSPADM

## 2025-02-11 RX ORDER — MIDAZOLAM HYDROCHLORIDE 1 MG/ML
1 INJECTION, SOLUTION INTRAMUSCULAR; INTRAVENOUS
Status: DISCONTINUED | OUTPATIENT
Start: 2025-02-11 | End: 2025-02-11 | Stop reason: HOSPADM

## 2025-02-11 RX ORDER — PROMETHAZINE HYDROCHLORIDE 25 MG/1
25 TABLET ORAL ONCE AS NEEDED
Status: CANCELLED | OUTPATIENT
Start: 2025-02-11

## 2025-02-11 RX ORDER — NALOXONE HCL 0.4 MG/ML
0.2 VIAL (ML) INJECTION AS NEEDED
Status: CANCELLED | OUTPATIENT
Start: 2025-02-11

## 2025-02-11 RX ORDER — DIPHENHYDRAMINE HYDROCHLORIDE 50 MG/ML
25 INJECTION INTRAMUSCULAR; INTRAVENOUS EVERY 4 HOURS PRN
Status: DISCONTINUED | OUTPATIENT
Start: 2025-02-11 | End: 2025-02-11 | Stop reason: HOSPADM

## 2025-02-11 RX ORDER — INDOCYANINE GREEN AND WATER 25 MG
KIT INJECTION AS NEEDED
Status: DISCONTINUED | OUTPATIENT
Start: 2025-02-11 | End: 2025-02-11 | Stop reason: SURG

## 2025-02-11 RX ORDER — LORAZEPAM 0.5 MG/1
0.5 TABLET ORAL EVERY 8 HOURS PRN
Status: DISCONTINUED | OUTPATIENT
Start: 2025-02-11 | End: 2025-02-12 | Stop reason: HOSPADM

## 2025-02-11 RX ORDER — METOCLOPRAMIDE HYDROCHLORIDE 5 MG/ML
10 INJECTION INTRAMUSCULAR; INTRAVENOUS ONCE AS NEEDED
Status: DISCONTINUED | OUTPATIENT
Start: 2025-02-11 | End: 2025-02-11 | Stop reason: HOSPADM

## 2025-02-11 RX ORDER — LABETALOL HYDROCHLORIDE 5 MG/ML
5 INJECTION, SOLUTION INTRAVENOUS
Status: DISCONTINUED | OUTPATIENT
Start: 2025-02-11 | End: 2025-02-11 | Stop reason: HOSPADM

## 2025-02-11 RX ORDER — FLUMAZENIL 0.1 MG/ML
0.2 INJECTION INTRAVENOUS AS NEEDED
Status: DISCONTINUED | OUTPATIENT
Start: 2025-02-11 | End: 2025-02-11 | Stop reason: HOSPADM

## 2025-02-11 RX ORDER — ALVIMOPAN 12 MG/1
12 CAPSULE ORAL ONCE
Status: COMPLETED | OUTPATIENT
Start: 2025-02-11 | End: 2025-02-11

## 2025-02-11 RX ORDER — GLYCOPYRROLATE 0.2 MG/ML
INJECTION INTRAMUSCULAR; INTRAVENOUS AS NEEDED
Status: DISCONTINUED | OUTPATIENT
Start: 2025-02-11 | End: 2025-02-11 | Stop reason: SURG

## 2025-02-11 RX ORDER — EPHEDRINE SULFATE 50 MG/ML
5 INJECTION, SOLUTION INTRAVENOUS ONCE AS NEEDED
Status: CANCELLED | OUTPATIENT
Start: 2025-02-11

## 2025-02-11 RX ORDER — EPHEDRINE SULFATE 50 MG/ML
INJECTION, SOLUTION INTRAVENOUS AS NEEDED
Status: DISCONTINUED | OUTPATIENT
Start: 2025-02-11 | End: 2025-02-11 | Stop reason: SURG

## 2025-02-11 RX ORDER — IPRATROPIUM BROMIDE AND ALBUTEROL SULFATE 2.5; .5 MG/3ML; MG/3ML
3 SOLUTION RESPIRATORY (INHALATION) ONCE AS NEEDED
Status: CANCELLED | OUTPATIENT
Start: 2025-02-11

## 2025-02-11 RX ORDER — FENTANYL CITRATE 50 UG/ML
50 INJECTION, SOLUTION INTRAMUSCULAR; INTRAVENOUS ONCE AS NEEDED
Status: DISCONTINUED | OUTPATIENT
Start: 2025-02-11 | End: 2025-02-11 | Stop reason: HOSPADM

## 2025-02-11 RX ORDER — DIPHENHYDRAMINE HYDROCHLORIDE 50 MG/ML
12.5 INJECTION INTRAMUSCULAR; INTRAVENOUS
Status: CANCELLED | OUTPATIENT
Start: 2025-02-11

## 2025-02-11 RX ORDER — LIDOCAINE HYDROCHLORIDE ANHYDROUS AND DEXTROSE MONOHYDRATE 5; 400 G/100ML; MG/100ML
INJECTION, SOLUTION INTRAVENOUS CONTINUOUS PRN
Status: DISCONTINUED | OUTPATIENT
Start: 2025-02-11 | End: 2025-02-11 | Stop reason: SURG

## 2025-02-11 RX ORDER — BUPIVACAINE HYDROCHLORIDE AND EPINEPHRINE 2.5; 5 MG/ML; UG/ML
INJECTION, SOLUTION EPIDURAL; INFILTRATION; INTRACAUDAL; PERINEURAL AS NEEDED
Status: DISCONTINUED | OUTPATIENT
Start: 2025-02-11 | End: 2025-02-11 | Stop reason: HOSPADM

## 2025-02-11 RX ORDER — PANTOPRAZOLE SODIUM 40 MG/1
40 TABLET, DELAYED RELEASE ORAL
Status: DISCONTINUED | OUTPATIENT
Start: 2025-02-12 | End: 2025-02-12 | Stop reason: HOSPADM

## 2025-02-11 RX ORDER — ONDANSETRON 2 MG/ML
4 INJECTION INTRAMUSCULAR; INTRAVENOUS ONCE AS NEEDED
Status: CANCELLED | OUTPATIENT
Start: 2025-02-11

## 2025-02-11 RX ORDER — SCOPOLAMINE 1 MG/3D
1 PATCH, EXTENDED RELEASE TRANSDERMAL CONTINUOUS
Status: DISCONTINUED | OUTPATIENT
Start: 2025-02-11 | End: 2025-02-12 | Stop reason: HOSPADM

## 2025-02-11 RX ORDER — LIDOCAINE HYDROCHLORIDE 20 MG/ML
INJECTION, SOLUTION INFILTRATION; PERINEURAL AS NEEDED
Status: DISCONTINUED | OUTPATIENT
Start: 2025-02-11 | End: 2025-02-11 | Stop reason: SURG

## 2025-02-11 RX ORDER — SODIUM CHLORIDE 0.9 % (FLUSH) 0.9 %
3-10 SYRINGE (ML) INJECTION AS NEEDED
Status: DISCONTINUED | OUTPATIENT
Start: 2025-02-11 | End: 2025-02-11 | Stop reason: HOSPADM

## 2025-02-11 RX ORDER — ACETAMINOPHEN 500 MG
1000 TABLET ORAL ONCE
Status: COMPLETED | OUTPATIENT
Start: 2025-02-11 | End: 2025-02-11

## 2025-02-11 RX ORDER — IPRATROPIUM BROMIDE AND ALBUTEROL SULFATE 2.5; .5 MG/3ML; MG/3ML
3 SOLUTION RESPIRATORY (INHALATION) ONCE AS NEEDED
Status: DISCONTINUED | OUTPATIENT
Start: 2025-02-11 | End: 2025-02-11 | Stop reason: HOSPADM

## 2025-02-11 RX ORDER — OXYCODONE AND ACETAMINOPHEN 7.5; 325 MG/1; MG/1
1 TABLET ORAL EVERY 4 HOURS PRN
Status: CANCELLED | OUTPATIENT
Start: 2025-02-11 | End: 2025-02-18

## 2025-02-11 RX ORDER — ACETAMINOPHEN 500 MG
1000 TABLET ORAL EVERY 6 HOURS
Status: DISCONTINUED | OUTPATIENT
Start: 2025-02-11 | End: 2025-02-12 | Stop reason: HOSPADM

## 2025-02-11 RX ORDER — ALBUTEROL SULFATE 0.83 MG/ML
2.5 SOLUTION RESPIRATORY (INHALATION) EVERY 4 HOURS PRN
Status: DISCONTINUED | OUTPATIENT
Start: 2025-02-11 | End: 2025-02-12 | Stop reason: HOSPADM

## 2025-02-11 RX ORDER — ROCURONIUM BROMIDE 10 MG/ML
INJECTION, SOLUTION INTRAVENOUS AS NEEDED
Status: DISCONTINUED | OUTPATIENT
Start: 2025-02-11 | End: 2025-02-11 | Stop reason: SURG

## 2025-02-11 RX ORDER — HYDROMORPHONE HYDROCHLORIDE 1 MG/ML
0.25 INJECTION, SOLUTION INTRAMUSCULAR; INTRAVENOUS; SUBCUTANEOUS
Status: DISCONTINUED | OUTPATIENT
Start: 2025-02-11 | End: 2025-02-12 | Stop reason: HOSPADM

## 2025-02-11 RX ORDER — NITROGLYCERIN 0.4 MG/1
0.4 TABLET SUBLINGUAL
Status: DISCONTINUED | OUTPATIENT
Start: 2025-02-11 | End: 2025-02-12 | Stop reason: HOSPADM

## 2025-02-11 RX ORDER — PHENYLEPHRINE HYDROCHLORIDE 10 MG/ML
INJECTION INTRAVENOUS AS NEEDED
Status: DISCONTINUED | OUTPATIENT
Start: 2025-02-11 | End: 2025-02-11 | Stop reason: SURG

## 2025-02-11 RX ORDER — CELECOXIB 200 MG/1
200 CAPSULE ORAL ONCE
Status: COMPLETED | OUTPATIENT
Start: 2025-02-11 | End: 2025-02-11

## 2025-02-11 RX ORDER — NALOXONE HCL 0.4 MG/ML
0.4 VIAL (ML) INJECTION
Status: DISCONTINUED | OUTPATIENT
Start: 2025-02-11 | End: 2025-02-12 | Stop reason: HOSPADM

## 2025-02-11 RX ORDER — HYDRALAZINE HYDROCHLORIDE 20 MG/ML
5 INJECTION INTRAMUSCULAR; INTRAVENOUS
Status: CANCELLED | OUTPATIENT
Start: 2025-02-11

## 2025-02-11 RX ORDER — ONDANSETRON 2 MG/ML
INJECTION INTRAMUSCULAR; INTRAVENOUS AS NEEDED
Status: DISCONTINUED | OUTPATIENT
Start: 2025-02-11 | End: 2025-02-11 | Stop reason: SURG

## 2025-02-11 RX ORDER — ONDANSETRON 2 MG/ML
4 INJECTION INTRAMUSCULAR; INTRAVENOUS EVERY 6 HOURS PRN
Status: DISCONTINUED | OUTPATIENT
Start: 2025-02-11 | End: 2025-02-12 | Stop reason: HOSPADM

## 2025-02-11 RX ORDER — ALVIMOPAN 12 MG/1
12 CAPSULE ORAL 2 TIMES DAILY
Status: DISCONTINUED | OUTPATIENT
Start: 2025-02-12 | End: 2025-02-12

## 2025-02-11 RX ORDER — LABETALOL HYDROCHLORIDE 5 MG/ML
5 INJECTION, SOLUTION INTRAVENOUS
Status: CANCELLED | OUTPATIENT
Start: 2025-02-11

## 2025-02-11 RX ORDER — HYDROMORPHONE HYDROCHLORIDE 1 MG/ML
0.5 INJECTION, SOLUTION INTRAMUSCULAR; INTRAVENOUS; SUBCUTANEOUS
Status: CANCELLED | OUTPATIENT
Start: 2025-02-11

## 2025-02-11 RX ORDER — CELECOXIB 200 MG/1
200 CAPSULE ORAL EVERY 12 HOURS SCHEDULED
Status: DISCONTINUED | OUTPATIENT
Start: 2025-02-11 | End: 2025-02-12 | Stop reason: HOSPADM

## 2025-02-11 RX ORDER — ACETAMINOPHEN 10 MG/ML
1000 INJECTION, SOLUTION INTRAVENOUS ONCE
Status: COMPLETED | OUTPATIENT
Start: 2025-02-11 | End: 2025-02-11

## 2025-02-11 RX ORDER — HYDROMORPHONE HYDROCHLORIDE 1 MG/ML
0.25 INJECTION, SOLUTION INTRAMUSCULAR; INTRAVENOUS; SUBCUTANEOUS
Status: DISCONTINUED | OUTPATIENT
Start: 2025-02-11 | End: 2025-02-11 | Stop reason: HOSPADM

## 2025-02-11 RX ORDER — NALOXONE HCL 0.4 MG/ML
0.4 VIAL (ML) INJECTION AS NEEDED
Status: DISCONTINUED | OUTPATIENT
Start: 2025-02-11 | End: 2025-02-11 | Stop reason: HOSPADM

## 2025-02-11 RX ADMIN — FAMOTIDINE 20 MG: 10 INJECTION INTRAVENOUS at 08:31

## 2025-02-11 RX ADMIN — GABAPENTIN 400 MG: 400 CAPSULE ORAL at 14:32

## 2025-02-11 RX ADMIN — PHENYLEPHRINE HYDROCHLORIDE 200 MCG: 10 INJECTION INTRAVENOUS at 11:14

## 2025-02-11 RX ADMIN — LIDOCAINE HYDROCHLORIDE 100 MG: 20 INJECTION, SOLUTION INFILTRATION; PERINEURAL at 09:13

## 2025-02-11 RX ADMIN — SODIUM CHLORIDE, POTASSIUM CHLORIDE, SODIUM LACTATE AND CALCIUM CHLORIDE 9 ML/HR: 600; 310; 30; 20 INJECTION, SOLUTION INTRAVENOUS at 08:26

## 2025-02-11 RX ADMIN — ROCURONIUM BROMIDE 50 MG: 10 INJECTION INTRAVENOUS at 09:13

## 2025-02-11 RX ADMIN — PHENYLEPHRINE HYDROCHLORIDE 100 MCG: 10 INJECTION INTRAVENOUS at 11:29

## 2025-02-11 RX ADMIN — METRONIDAZOLE 500 MG: 500 INJECTION, SOLUTION INTRAVENOUS at 09:03

## 2025-02-11 RX ADMIN — PHENYLEPHRINE HYDROCHLORIDE 100 MCG: 10 INJECTION INTRAVENOUS at 10:44

## 2025-02-11 RX ADMIN — LIDOCAINE HYDROCHLORIDE 2 MG/MIN: 4 INJECTION, SOLUTION INTRAVENOUS at 09:20

## 2025-02-11 RX ADMIN — PHENYLEPHRINE HYDROCHLORIDE 100 MCG: 10 INJECTION INTRAVENOUS at 11:24

## 2025-02-11 RX ADMIN — SCOPOLAMINE 1 PATCH: 1.5 PATCH, EXTENDED RELEASE TRANSDERMAL at 08:26

## 2025-02-11 RX ADMIN — EPHEDRINE SULFATE 5 MG: 50 INJECTION INTRAVENOUS at 11:29

## 2025-02-11 RX ADMIN — DEXAMETHASONE SODIUM PHOSPHATE 4 MG: 4 INJECTION, SOLUTION INTRA-ARTICULAR; INTRALESIONAL; INTRAMUSCULAR; INTRAVENOUS; SOFT TISSUE at 09:20

## 2025-02-11 RX ADMIN — PHENYLEPHRINE HYDROCHLORIDE 100 MCG: 10 INJECTION INTRAVENOUS at 09:47

## 2025-02-11 RX ADMIN — GABAPENTIN 600 MG: 300 CAPSULE ORAL at 08:26

## 2025-02-11 RX ADMIN — ROCURONIUM BROMIDE 20 MG: 10 INJECTION INTRAVENOUS at 11:05

## 2025-02-11 RX ADMIN — Medication 10 MG: at 13:08

## 2025-02-11 RX ADMIN — MAGNESIUM SULFATE HEPTAHYDRATE 2 G: 500 INJECTION, SOLUTION INTRAMUSCULAR; INTRAVENOUS at 09:11

## 2025-02-11 RX ADMIN — CELECOXIB 200 MG: 200 CAPSULE ORAL at 20:44

## 2025-02-11 RX ADMIN — PRAMIPEXOLE DIHYDROCHLORIDE 0.5 MG: 0.5 TABLET ORAL at 20:44

## 2025-02-11 RX ADMIN — PHENYLEPHRINE HYDROCHLORIDE 200 MCG: 10 INJECTION INTRAVENOUS at 10:34

## 2025-02-11 RX ADMIN — CELECOXIB 200 MG: 200 CAPSULE ORAL at 08:26

## 2025-02-11 RX ADMIN — Medication 10 MG: at 11:14

## 2025-02-11 RX ADMIN — ALVIMOPAN 12 MG: 12 CAPSULE ORAL at 08:26

## 2025-02-11 RX ADMIN — GABAPENTIN 400 MG: 400 CAPSULE ORAL at 22:36

## 2025-02-11 RX ADMIN — INDOCYANINE GREEN 7.5 MG: KIT INTRAVENOUS at 10:48

## 2025-02-11 RX ADMIN — PHENYLEPHRINE HYDROCHLORIDE 100 MCG: 10 INJECTION INTRAVENOUS at 09:25

## 2025-02-11 RX ADMIN — ONDANSETRON 4 MG: 2 INJECTION INTRAMUSCULAR; INTRAVENOUS at 09:20

## 2025-02-11 RX ADMIN — PHENYLEPHRINE HYDROCHLORIDE 100 MCG: 10 INJECTION INTRAVENOUS at 09:49

## 2025-02-11 RX ADMIN — PHENYLEPHRINE HYDROCHLORIDE 200 MCG: 10 INJECTION INTRAVENOUS at 10:24

## 2025-02-11 RX ADMIN — BUPIVACAINE: 13.3 INJECTION, SUSPENSION, LIPOSOMAL INFILTRATION at 15:33

## 2025-02-11 RX ADMIN — ACETAMINOPHEN 1000 MG: 500 TABLET, FILM COATED ORAL at 17:13

## 2025-02-11 RX ADMIN — GLYCOPYRROLATE 0.2 MG: 0.2 INJECTION INTRAMUSCULAR; INTRAVENOUS at 09:49

## 2025-02-11 RX ADMIN — SODIUM CHLORIDE 50 ML/HR: 9 INJECTION, SOLUTION INTRAVENOUS at 14:32

## 2025-02-11 RX ADMIN — HYDROMORPHONE HYDROCHLORIDE 0.25 MG: 1 INJECTION, SOLUTION INTRAMUSCULAR; INTRAVENOUS; SUBCUTANEOUS at 20:42

## 2025-02-11 RX ADMIN — EPHEDRINE SULFATE 10 MG: 50 INJECTION INTRAVENOUS at 09:51

## 2025-02-11 RX ADMIN — BUPIVACAINE 20 ML: 13.3 INJECTION, SUSPENSION, LIPOSOMAL INFILTRATION at 09:26

## 2025-02-11 RX ADMIN — PHENYLEPHRINE HYDROCHLORIDE 200 MCG: 10 INJECTION INTRAVENOUS at 09:30

## 2025-02-11 RX ADMIN — ACETAMINOPHEN 1000 MG: 500 TABLET, FILM COATED ORAL at 08:26

## 2025-02-11 RX ADMIN — ACETAMINOPHEN 1000 MG: 1000 INJECTION INTRAVENOUS at 12:47

## 2025-02-11 RX ADMIN — PHENYLEPHRINE HYDROCHLORIDE 200 MCG: 10 INJECTION INTRAVENOUS at 10:54

## 2025-02-11 RX ADMIN — ONDANSETRON 4 MG: 2 INJECTION INTRAMUSCULAR; INTRAVENOUS at 15:26

## 2025-02-11 RX ADMIN — Medication 30 MG: at 09:16

## 2025-02-11 RX ADMIN — Medication 10 MG: at 12:47

## 2025-02-11 RX ADMIN — PHENYLEPHRINE HYDROCHLORIDE 200 MCG: 10 INJECTION INTRAVENOUS at 11:39

## 2025-02-11 RX ADMIN — CEFAZOLIN 2 G: 2 INJECTION, POWDER, FOR SOLUTION INTRAMUSCULAR; INTRAVENOUS at 09:03

## 2025-02-11 RX ADMIN — PROPOFOL 200 MG: 10 INJECTION, EMULSION INTRAVENOUS at 09:13

## 2025-02-11 RX ADMIN — BUPIVACAINE HYDROCHLORIDE 40 ML: 2.5 INJECTION, SOLUTION EPIDURAL; INFILTRATION; INTRACAUDAL; PERINEURAL at 09:26

## 2025-02-11 RX ADMIN — Medication 10 MG: at 10:14

## 2025-02-11 RX ADMIN — HYDROMORPHONE HYDROCHLORIDE 0.25 MG: 1 INJECTION, SOLUTION INTRAMUSCULAR; INTRAVENOUS; SUBCUTANEOUS at 13:40

## 2025-02-11 RX ADMIN — ROCURONIUM BROMIDE 10 MG: 10 INJECTION INTRAVENOUS at 10:14

## 2025-02-11 RX ADMIN — HYDROMORPHONE HYDROCHLORIDE 0.25 MG: 1 INJECTION, SOLUTION INTRAMUSCULAR; INTRAVENOUS; SUBCUTANEOUS at 12:47

## 2025-02-11 RX ADMIN — SUGAMMADEX 200 MG: 100 INJECTION, SOLUTION INTRAVENOUS at 11:37

## 2025-02-11 RX ADMIN — EPHEDRINE SULFATE 10 MG: 50 INJECTION INTRAVENOUS at 09:52

## 2025-02-11 NOTE — PLAN OF CARE
Goal Outcome Evaluation:  Plan of Care Reviewed With: patient        Progress: improving  Outcome Evaluation: Ambulated to bed with standby assist, little bit off balance due to dizziness. Up to toilet later, dizziness improved, gait more steady. Pain under controlled with scheduled meds so far. Voided after F/C d'cd, output in chart. Call light within reach, family at bedside.

## 2025-02-11 NOTE — ANESTHESIA POSTPROCEDURE EVALUATION
Patient: Barb Clinton    Procedure Summary       Date: 02/11/25 Room / Location: [facility] OR [address] [place] [address] MAIN OR    Anesthesia Start: 0908 Anesthesia Stop: 1155    Procedure: laparoscopic robotic assisted right colectomy (Right: Abdomen) Diagnosis:       Adenomatous polyp of ascending colon      (Adenomatous polyp of ascending colon [D12.2])    Surgeons: Marizol Valladares MD Provider: Sree Acosta MD    Anesthesia Type: general, regional ASA Status: 2            Anesthesia Type: general, regional    Vitals  Vitals Value Taken Time   /60 02/11/25 1230   Temp 36.5 °C (97.7 °F) 02/11/25 1152   Pulse 92 02/11/25 1241   Resp 16 02/11/25 1230   SpO2 100 % 02/11/25 1241   Vitals shown include unfiled device data.        Post Anesthesia Care and Evaluation    Patient location during evaluation: PACU  Patient participation: complete - patient participated  Level of consciousness: awake  Pain management: adequate    Airway patency: patent  Anesthetic complications: No anesthetic complications    Cardiovascular status: acceptable  Respiratory status: acceptable  Hydration status: acceptable    Comments: /60   Pulse 92   Temp 36.5 °C (97.7 °F) (Oral)   Resp 16   SpO2 100%

## 2025-02-11 NOTE — ANESTHESIA PROCEDURE NOTES
Airway  Urgency: elective    Date/Time: 2/11/2025 9:16 AM  Airway not difficult    General Information and Staff    Patient location during procedure: OR  CRNA/CAA: Sameer Davidson CRNA    Indications and Patient Condition  Indications for airway management: airway protection    Preoxygenated: yes  MILS not maintained throughout  Mask difficulty assessment: 1 - vent by mask    Final Airway Details  Final airway type: endotracheal airway      Successful airway: ETT  Cuffed: yes   Successful intubation technique: direct laryngoscopy  Endotracheal tube insertion site: oral  Blade: Jia  Blade size: 3  ETT size (mm): 7.0  Cormack-Lehane Classification: grade I - full view of glottis  Placement verified by: chest auscultation and capnometry   Cuff volume (mL): 9  Measured from: teeth  ETT/EBT  to teeth (cm): 22  Number of attempts at approach: 1  Assessment: lips, teeth, and gum same as pre-op and atraumatic intubation

## 2025-02-11 NOTE — ANESTHESIA PROCEDURE NOTES
Peripheral Block      Patient reassessed immediately prior to procedure    Patient location during procedure: OR  Reason for block: at surgeon's request and post-op pain management  Performed by  Anesthesiologist: Sree Acosta MD  Preanesthetic Checklist  Completed: patient identified, IV checked, site marked, risks and benefits discussed, surgical consent, monitors and equipment checked, pre-op evaluation and timeout performed  Prep:  Pt Position: supine  Sterile barriers:cap, washed/disinfected hands, gloves and mask  Prep: ChloraPrep  Patient monitoring: blood pressure monitoring, continuous pulse oximetry and EKG  Procedure    Sedation: yes  Performed under: general  Guidance:ultrasound guided  Images:still images obtained, printed/placed on chart    Laterality:Bilateral  Block Type:rectus sheath  Injection Technique:single-shot  Needle Type:echogenic  Needle Gauge:21 G  Resistance on Injection: none    Medications Used: bupivacaine liposome (EXPAREL) 1.3 % injection - Infiltration   20 mL - 2/11/2025 9:26:00 AM  bupivacaine PF (MARCAINE) 0.25 % injection - Injection   40 mL - 2/11/2025 9:26:00 AM      Post Assessment  Injection Assessment: negative aspiration for heme, no paresthesia on injection and incremental injection  Patient Tolerance:comfortable throughout block  Complications:no  Additional Notes  Ultrasound visualization of pertinent anatomy and needle placement. Procedure performed as above bilaterally with 10cc exparel and 20cc 0.25% bupivacaine on each side.   Performed by: Sree Acosta MD

## 2025-02-11 NOTE — ANESTHESIA PREPROCEDURE EVALUATION
Anesthesia Evaluation     Patient summary reviewed and Nursing notes reviewed                Airway   Mallampati: II  TM distance: >3 FB  Neck ROM: full  Dental    (+) edentulous    Pulmonary    (+) a smoker (2017) Former, asthma,  Cardiovascular     ECG reviewed    (+) hypertension, hyperlipidemia      Neuro/Psych  GI/Hepatic/Renal/Endo    (+) obesity, thyroid problem     Musculoskeletal     Abdominal    Substance History      OB/GYN          Other   arthritis,   history of cancer    ROS/Med Hx Other: A partial right colectomy, also known as a cecectomy                    Anesthesia Plan    ASA 2     general and regional     (I have reviewed the patient's history with the patient and the chart, including all pertinent laboratory results and imaging. I have explained the risks of anesthesia including but not limited to dental damage, corneal abrasion, nerve injury, MI, stroke, and death. Questions asked and answered. Anesthetic plan discussed with patient and team as indicated. Patient expressed understanding of the above.    TAP block)  intravenous induction     Anesthetic plan, risks, benefits, and alternatives have been provided, discussed and informed consent has been obtained with: patient.        CODE STATUS:

## 2025-02-11 NOTE — OP NOTE
Surgeon: Marizol Valladares MD    Surgical  Assistant: Alba Bermeo PA-C     Preoperative diagnosis: Adenomatous polyp of ascending colon [D12.2]    Post-Op Diagnosis Codes:     * Adenomatous polyp of ascending colon [D12.2]    Procedure: laparoscopic robotic assisted right colectomy, * Panel 2 does not exist *    Estimated Blood Loss: 250 mL    Specimens:   Specimens       ID Source Type Tests Collected By Collected At Frozen?    A Large Intestine, Right / Ascending Colon Tissue TISSUE PATHOLOGY EXAM   Marizol Valladares MD 2/11/25 1118 No    Description: RIGHT COLON    This specimen was not marked as sent.           Order Name Source Comment Collection Info Order Time   TISSUE PATHOLOGY EXAM Large Intestine, Right / Ascending Colon  Collected By: Marizol Valladares MD 2/11/2025 11:30 AM     Release to patient   Routine Release            Indication:  Barb Clinton is a 59 y.o. female who comes in with Adenomatous polyp of ascending colon  Patient understands risks, benefits,and alternatives wishes to proceed.      Procedure Details:  The patient was brought to the operating room, had SCDs in place, antibiotics infused. After general anesthesia was achieved, TAP block was done, and then the patient was prepped and draped in sterile fashion. 0.25% Marcaine with epinephrine was used for local infiltration at the trocar sites. First, I made a incision just right under the ribs then used a Veress needle to gain access into the abdomen. The abdomen was Insufflated up to 12 mmHg. I then placed an 8 mm trocar into the abdomen. I was able to place 3 other trocars all in a diagonal approximately 9 cm apart going to the right side of midline at the level of the ASIS. I was able to place an assistant port, 8 mm in the left lower quadrant. The patient was tilted slight at 6 degrees right side up.  I docked the robot and placed all the instruments in under direct visualization.   I was able to lift the transverse colon up,  "identified the ileocolic vessel, and dissected the space between the IC vessels and the duodenum.  I took the ileocolic with the vessel sealer, sweeping the duodenum out of the way. I was able to develop the plane to mobilize the colon medial to lateral. Once I got the colon completely mobilized along the white line of Toldt, I was able to take the omentum off the transverse colon. I then used the vessel sealer to take the right branch of the middle colic artery, and then take the mesentery with the vessel sealer. I took the mesentery of the distal terminal ileum.  I placed a 60 mm blue load of the stapler 60 mm approximately 5 cm from the ileocecal valve.  I had anesthesia give 3 mL of ICG to delineate the blood supply of the transverse colon.  Approximately a centimeter from the demarcated area on the profuse side I used another load of the 60 mm blue stapler to go across the transverse colon.  I moved the specimen over the liver.  I brought the terminal ileum to the transverse colon, and in an isoperistaltic fashion did the anastomosis making an enterotomy in the small intestine and the transverse colon, and then stapling to make the common channel.  I closed the common enterotomy using a 6\" Stratafix first layer in [place] fashion and then [place] at the second layer.  Tisseel was placed on all the staple lines and the suture line.  I extended the left upper quadrant 12 mm trocar site.  I entered the abdomen and placed a medium Abilio wound retractor to protect the abdominal wall.  I brought the specimen out and then remove the Abilio wound retractor.  I then closed the posterior then anterior fascia with separate #1 PDS.  The subcutaneous tissue was irrigated out with saline.  We used a 4-0 Monocryl in a subcuticular fashion to close the skin.  Skin glue was used as dressing. All instrument, lap counts, needle counts were correct. Patient was stable throughout the entire case.     This procedure was not " performed to treat colon cancer through resection       Assistant: Alba Bermeo PA-C was responsible for performing the following activities: Retraction, Suction, Irrigation, Suturing, Closing and Placing Dressing and their skilled assistance was necessary for the success of this case

## 2025-02-12 ENCOUNTER — READMISSION MANAGEMENT (OUTPATIENT)
Dept: CALL CENTER | Facility: HOSPITAL | Age: 60
End: 2025-02-12
Payer: COMMERCIAL

## 2025-02-12 VITALS
RESPIRATION RATE: 18 BRPM | HEIGHT: 65 IN | BODY MASS INDEX: 31.36 KG/M2 | TEMPERATURE: 98.6 F | OXYGEN SATURATION: 100 % | WEIGHT: 188.2 LBS | DIASTOLIC BLOOD PRESSURE: 76 MMHG | HEART RATE: 97 BPM | SYSTOLIC BLOOD PRESSURE: 124 MMHG

## 2025-02-12 LAB
ANION GAP SERPL CALCULATED.3IONS-SCNC: 11.7 MMOL/L (ref 5–15)
BASOPHILS # BLD AUTO: 0.01 10*3/MM3 (ref 0–0.2)
BASOPHILS NFR BLD AUTO: 0.1 % (ref 0–1.5)
BUN SERPL-MCNC: 10 MG/DL (ref 6–20)
BUN/CREAT SERPL: 10.8 (ref 7–25)
CALCIUM SPEC-SCNC: 9.2 MG/DL (ref 8.6–10.5)
CHLORIDE SERPL-SCNC: 103 MMOL/L (ref 98–107)
CO2 SERPL-SCNC: 21.3 MMOL/L (ref 22–29)
CREAT SERPL-MCNC: 0.93 MG/DL (ref 0.57–1)
CYTO UR: NORMAL
DEPRECATED RDW RBC AUTO: 39.1 FL (ref 37–54)
EGFRCR SERPLBLD CKD-EPI 2021: 70.9 ML/MIN/1.73
EOSINOPHIL # BLD AUTO: 0 10*3/MM3 (ref 0–0.4)
EOSINOPHIL NFR BLD AUTO: 0 % (ref 0.3–6.2)
ERYTHROCYTE [DISTWIDTH] IN BLOOD BY AUTOMATED COUNT: 12.8 % (ref 12.3–15.4)
GLUCOSE SERPL-MCNC: 110 MG/DL (ref 65–99)
HCT VFR BLD AUTO: 33.5 % (ref 34–46.6)
HGB BLD-MCNC: 10.7 G/DL (ref 12–15.9)
IMM GRANULOCYTES # BLD AUTO: 0.03 10*3/MM3 (ref 0–0.05)
IMM GRANULOCYTES NFR BLD AUTO: 0.3 % (ref 0–0.5)
LAB AP CASE REPORT: NORMAL
LAB AP DIAGNOSIS COMMENT: NORMAL
LYMPHOCYTES # BLD AUTO: 1.34 10*3/MM3 (ref 0.7–3.1)
LYMPHOCYTES NFR BLD AUTO: 11.7 % (ref 19.6–45.3)
MAGNESIUM SERPL-MCNC: 1.9 MG/DL (ref 1.6–2.6)
MCH RBC QN AUTO: 26.5 PG (ref 26.6–33)
MCHC RBC AUTO-ENTMCNC: 31.9 G/DL (ref 31.5–35.7)
MCV RBC AUTO: 82.9 FL (ref 79–97)
MONOCYTES # BLD AUTO: 1.05 10*3/MM3 (ref 0.1–0.9)
MONOCYTES NFR BLD AUTO: 9.1 % (ref 5–12)
NEUTROPHILS NFR BLD AUTO: 78.8 % (ref 42.7–76)
NEUTROPHILS NFR BLD AUTO: 9.05 10*3/MM3 (ref 1.7–7)
NRBC BLD AUTO-RTO: 0 /100 WBC (ref 0–0.2)
PATH REPORT.FINAL DX SPEC: NORMAL
PATH REPORT.GROSS SPEC: NORMAL
PLATELET # BLD AUTO: 219 10*3/MM3 (ref 140–450)
PMV BLD AUTO: 10 FL (ref 6–12)
POTASSIUM SERPL-SCNC: 4.3 MMOL/L (ref 3.5–5.2)
RBC # BLD AUTO: 4.04 10*6/MM3 (ref 3.77–5.28)
SODIUM SERPL-SCNC: 136 MMOL/L (ref 136–145)
WBC NRBC COR # BLD AUTO: 11.48 10*3/MM3 (ref 3.4–10.8)

## 2025-02-12 PROCEDURE — 99024 POSTOP FOLLOW-UP VISIT: CPT | Performed by: PHYSICIAN ASSISTANT

## 2025-02-12 PROCEDURE — 85025 COMPLETE CBC W/AUTO DIFF WBC: CPT | Performed by: COLON & RECTAL SURGERY

## 2025-02-12 PROCEDURE — 83735 ASSAY OF MAGNESIUM: CPT | Performed by: COLON & RECTAL SURGERY

## 2025-02-12 PROCEDURE — 25010000002 ENOXAPARIN PER 10 MG: Performed by: COLON & RECTAL SURGERY

## 2025-02-12 PROCEDURE — 25010000002 HYDROMORPHONE PER 4 MG: Performed by: COLON & RECTAL SURGERY

## 2025-02-12 PROCEDURE — 80048 BASIC METABOLIC PNL TOTAL CA: CPT | Performed by: COLON & RECTAL SURGERY

## 2025-02-12 RX ORDER — GABAPENTIN 400 MG/1
400 CAPSULE ORAL EVERY 8 HOURS SCHEDULED
Qty: 30 CAPSULE | Refills: 0 | Status: SHIPPED | OUTPATIENT
Start: 2025-02-12 | End: 2025-02-22

## 2025-02-12 RX ORDER — ACETAMINOPHEN 500 MG
1000 TABLET ORAL EVERY 6 HOURS
Start: 2025-02-12

## 2025-02-12 RX ADMIN — ACETAMINOPHEN 1000 MG: 500 TABLET, FILM COATED ORAL at 12:42

## 2025-02-12 RX ADMIN — CELECOXIB 200 MG: 200 CAPSULE ORAL at 08:53

## 2025-02-12 RX ADMIN — ALVIMOPAN 12 MG: 12 CAPSULE ORAL at 08:53

## 2025-02-12 RX ADMIN — GABAPENTIN 400 MG: 400 CAPSULE ORAL at 06:22

## 2025-02-12 RX ADMIN — HYDROMORPHONE HYDROCHLORIDE 0.25 MG: 1 INJECTION, SOLUTION INTRAMUSCULAR; INTRAVENOUS; SUBCUTANEOUS at 06:20

## 2025-02-12 RX ADMIN — ENOXAPARIN SODIUM 40 MG: 100 INJECTION SUBCUTANEOUS at 08:53

## 2025-02-12 RX ADMIN — ACETAMINOPHEN 1000 MG: 500 TABLET, FILM COATED ORAL at 00:08

## 2025-02-12 RX ADMIN — PANTOPRAZOLE SODIUM 40 MG: 40 TABLET, DELAYED RELEASE ORAL at 06:22

## 2025-02-12 RX ADMIN — GABAPENTIN 400 MG: 400 CAPSULE ORAL at 13:57

## 2025-02-12 RX ADMIN — LORAZEPAM 0.5 MG: 0.5 TABLET ORAL at 09:12

## 2025-02-12 NOTE — PLAN OF CARE
Goal Outcome Evaluation:  Plan of Care Reviewed With: patient        Progress: improving  Outcome Evaluation: Vitals stable. Ambulating frequently. Moderate reports of pain - not requesting any further pain medication. Had a BM today. Tolerating a GI soft diet well. Spouse at bedside - supportive. Plan for discharge today.

## 2025-02-12 NOTE — CASE MANAGEMENT/SOCIAL WORK
Discharge Planning Assessment  [facility]     Patient Name: Barb Clinton  MRN: [id number]  Today's Date: 2/12/2025    Admit Date: 2/11/2025    Plan: Return home via spouse. Denies needs   Discharge Needs Assessment       Row Name 02/12/25 1403       Living Environment    People in Home child(johana), adult;spouse    Current Living Arrangements home    Primary Care Provided by self    Provides Primary Care For child(johana)    Family Caregiver if Needed spouse    Able to Return to Prior Arrangements yes       Resource/Environmental Concerns    Transportation Concerns none       Transition Planning    Patient/Family Anticipates Transition to home with family       Discharge Needs Assessment    Equipment Currently Used at Home none    Concerns to be Addressed no discharge needs identified    Anticipated Changes Related to Illness none                   Discharge Plan       Row Name 02/12/25 1405       Plan    Plan Return home via spouse. Denies needs    Patient/Family in Agreement with Plan yes    Plan Comments CCP met with patient at bedside. Introduced self and explained role. Patient lives with her  and their disabled adult son. She is IADL. uses no DME, and drives herself to appts. She denies any AD/LW documents. She sees Keaton Snow for PCP. She denies any HH/[facility] history. At discharge she plans to return home via her . She denies any discharge planning needs. Sandra HOROWITZ RN/CCP                  Continued Care and Services - Admitted Since 2/11/2025    No active coordination exists for this encounter.          Demographic Summary       Row Name 02/12/25 1408       General Information    Admission Type inpatient    Arrived From home    Referral Source admission list    Reason for Consult discharge planning                   Functional Status       Row Name 02/12/25 1401       Functional Status    Usual Activity Tolerance good    Current Activity Tolerance good       Functional Status, IADL    Medications  independent    Meal Preparation independent    Housekeeping independent    Laundry independent    Shopping independent                   Psychosocial    No documentation.                  Abuse/Neglect    No documentation.                  Legal    No documentation.                  Substance Abuse    No documentation.                  Patient Forms    No documentation.                     Jordyn Edwards

## 2025-02-12 NOTE — PROGRESS NOTES
Colorectal Surgery Progress Note    POD 1     S: positive flatus,  positive BM. negative ambulating.  Reports some significant right lower quadrant abdominal pain that has improved somewhat with Dilaudid.  Tolerating full liquids. Urinating without difficulty.    O:  Temp:  [97.7 °F (36.5 °C)-98.8 °F (37.1 °C)] 98.8 °F (37.1 °C)  Heart Rate:  [] 106  Resp:  [14-18] 18  BP: ()/(50-73) 116/66    Intake/Output Summary (Last 24 hours) at 2/12/2025 0906  Last data filed at 2/12/2025 0620  Gross per 24 hour   Intake 2398 ml   Output 3650 ml   Net -1252 ml     Abd: soft, non-distended.  Incisions: clean, dry, intact, no erythema    Results from last 7 days   Lab Units 02/12/25  0254   WBC 10*3/mm3 11.48*   HEMOGLOBIN g/dL 10.7*   HEMATOCRIT % 33.5*   PLATELETS 10*3/mm3 219     Results from last 7 days   Lab Units 02/12/25  0253   SODIUM mmol/L 136   POTASSIUM mmol/L 4.3   CHLORIDE mmol/L 103   CO2 mmol/L 21.3*   BUN mg/dL 10   CREATININE mg/dL 0.93   EGFR mL/min/1.73 70.9   GLUCOSE mg/dL 110*   CALCIUM mg/dL 9.2     Results from last 7 days   Lab Units 02/12/25  0253   MAGNESIUM mg/dL 1.9     A/P: 59 y.o. female s/p laparoscopic robotic assisted right colectomy  Advance diet to regular  Encouraged ambulation and sitting up in chair  Likely home later today or tomorrow, once tolerating regular diet and having bowel movements  Will try dose of Ativan for right lower quadrant abdominal pain

## 2025-02-12 NOTE — PAYOR COMM NOTE
"Oz Barb NELSY (59 y.o. Female)      NOTIFICATION OF ADMISSION:  REF#   [id number]     UR\" [phone]  [phone]    [facility]: NPI [id number]  [facility] [id number]    VINCENZO BHATT RN,[facility]       Date of Birth   1965    Social Security Number       Address   [address] [place] [address]    Home Phone   [phone]    MRN   [id number]       [facility]    Marital Status                               Admission Date   2/11/25    Admission Type   Elective    Admitting Provider   Marizol Valladares MD    Attending Provider   Marizol Valladares MD    Department, Room/Bed   [address], [address]/1       Discharge Date       Discharge Disposition       Discharge Destination                                 Attending Provider: Marizol Valladares MD    Allergies: No Known Allergies    Isolation: None   Infection: None   Code Status: CPR    Ht: 165.1 cm (65\")   Wt: 85.4 kg (188 lb 3.2 oz)    Admission Cmt: None   Principal Problem: Adenomatous polyp of ascending colon [D12.2]                   Active Insurance as of 2/11/2025       Primary Coverage       Payor Plan Insurance Group Employer/Plan Group    [facility] [id number]       Payor Plan Address Payor Plan Phone Number Payor Plan Fax Number Effective Dates    [address]   11/1/2019 - None Entered    [place] [address]         Subscriber Name Subscriber Birth Date Member ID       ALENA MARQUEZ 4[birth year]/1962 [id number]                     Emergency Contacts        (Rel.) Home Phone Work Phone Mobile Phone    ABDELRAHMAN NYE (Mother) [phone] -- [phone]    ALENA MARQUEZ (Spouse) -- -- [phone]                 History & Physical        Marizol Valladares MD at 02/11/25 3886          H&P reviewed.  The patient was examined and there are no changes to the H&P  Electronically signed by Marizol Valladares MD at 02/11/25 1050   Source Note: H&P (View-Only)          Barb WHITTINGTON " Oz is a 59 y.o. female who is seen as a consult at the request of Dr. Dawson for evaluation of colon polyp    HPI:  History of Present Illness  The patient is a 59-year-old female who underwent a colonoscopy several weeks ago and was found to have a very flat polyp in the cecum. She also had one at the hepatic flexure. The flat polyp in the cecum could not be removed but was biopsied and came back as tubular adenoma. The hepatic flexure polyp also came back as tubular adenoma. She comes to discuss surgical options.    She reports experiencing intermittent sharp, stabbing pain for approximately one year, which she has been attributing to gas pains. She expresses a desire to expedite the surgical process due to her active lifestyle, which includes frequent motorcycle riding with her .    She also mentions the presence of four clamps and seeks clarification on the duration she is required to carry the associated card.    MEDICATIONS  Past: gabapentin       Past Medical History:   Diagnosis Date    Arthritis     DDD NECK LOWER LUMBAR SPINE, DDD T SPINE. PAIN IN HANDS    Asthma     Colon polyp 12/20/24    Family history of colon cancer     FATHER    Hypercholesterolemia     MEDS IN THE PAST. NO CURRENTLY    Hypertension     HISTORY OF..HAS NOT NEEDED MEDICINES RECENTLY    Restless leg     Subclinical hyperthyroidism 03/22/2024    Vitamin D deficiency        Past Surgical History:   Procedure Laterality Date    COLONOSCOPY      ENDOSCOPY      MULTIPLE TOOTH EXTRACTIONS      COMPLETE UPPER AND LOWER       Social History:   reports that she quit smoking about 7 years ago. Her smoking use included cigarettes. She started smoking about 46 years ago. She has a 39.7 pack-year smoking history. She has never been exposed to tobacco smoke. She has never used smokeless tobacco. She reports current alcohol use of about 2.0 standard drinks of alcohol per week. She reports that she does not use drugs.      Marriage status:      Family History   Problem Relation Age of Onset    Thyroid disease Mother     Hypertension Mother     Kidney disease Mother     Asthma Mother     Cancer Father          on 10/29/2002 with Colon Cancer    Asthma Daughter     Asthma Son     Diabetes Son     Learning disabilities Son     Mental retardation Son     Thyroid disease Maternal Aunt     Arthritis Maternal Uncle     Arthritis Maternal Grandmother     Malig Hyperthermia Neg Hx          Current Outpatient Medications:     albuterol sulfate  (90 Base) MCG/ACT inhaler, Inhale 2 puffs Every 4 (Four) Hours As Needed for Wheezing. (Patient taking differently: Inhale 2 puffs Every 4 (Four) Hours As Needed for Wheezing (RESCUE INHALER).), Disp: 18 g, Rfl: 5    cyclobenzaprine (FLEXERIL) 10 MG tablet, Take 1 tablet by mouth every night at bedtime., Disp: 90 tablet, Rfl: 1    diclofenac (VOLTAREN) 75 MG EC tablet, Take 1 tablet by mouth 2 (Two) Times a Day. (Patient taking differently: Take 1 tablet by mouth 2 (Two) Times a Day. WILL HOLD FOR SURGERY), Disp: 180 tablet, Rfl: 0    pramipexole (MIRAPEX) 0.5 MG tablet, TAKE 1 TABLET BY MOUTH AT NIGHT, Disp: 90 tablet, Rfl: 1    Chlorhexidine Gluconate 4 % solution, Apply 1 Application topically to the appropriate area as directed 2 (Two) Times a Day. Shower With Hibiclens Solution Twice The Day Before Surgery, Disp: 236 mL, Rfl: 0    ciprofloxacin (CIPRO) 500 MG tablet, Take at 8 pm the day before surgery (Patient taking differently: See Admin Instructions. Take at 8 pm the day before surgery), Disp: 1 tablet, Rfl: 0    metroNIDAZOLE (FLAGYL) 500 MG tablet, Take 1 pill at 8 pm and then at 10 pm the day before surgery (Patient taking differently: See Admin Instructions. Take 1 pill at 8 pm and then at 10 pm the day before surgery), Disp: 2 tablet, Rfl: 0    Allergy  Patient has no known allergies.      Vitals:    25 1326   BP: 132/88   Pulse: 88   SpO2: 96%     Body mass index is 29.29  kg/m².      Physical Exam    Physical Exam  Constitutional:       General: She is not in acute distress.     Appearance: She is well-developed.   HENT:      Head: Normocephalic and atraumatic.   Abdominal:      General: There is no distension.      Palpations: Abdomen is soft.   Musculoskeletal:         General: Normal range of motion.   Neurological:      Mental Status: She is alert.   Psychiatric:         Thought Content: Thought content normal.         Review of Medical Record:  I reviewed medical records as detailed in HPI.     Assessment & Plan  1. Tubular adenoma.  The patient underwent a colonoscopy several weeks ago, which revealed a very flat polyp in the cecum and another at the hepatic flexure. Both polyps were biopsied and came back as tubular adenomas. The cecal polyp could not be removed during the colonoscopy. A detailed discussion was held regarding the nature of polyps, their potential for malignancy, and the associated risks. The patient was informed that tubular adenomas are the lowest risk for turning into cancer. Given the size of the polyp (less than 1 cm), there is a 10-15% chance of cancerous changes. A partial right colectomy, also known as a cecectomy, was recommended. This procedure involves removing the cecum, appendix, and a small portion of the small intestine, followed by reattachment of the small intestine to the right colon. The surgery will be performed laparoscopically using the Da Nilda robot and is expected to take 2-3 hours. The patient will need to stay in the hospital for 1-2 nights, depending on recovery. Post-operative care includes an abdominal wall block for pain management, followed by a cocktail of medications including Tylenol, Celebrex, and gabapentin. The patient was advised to avoid driving and lifting anything heavier than 15 pounds for the first two weeks post-surgery. She was also instructed to avoid forcing food intake and to remain active by sitting up in a  chair and walking around the house four times daily. The patient was informed about the low risk of infection (less than 10%) and the rare need for an ostomy bag (less than 2%). She was advised to contact the office immediately if she experiences fever, increased pain, or drainage from the incision site. Two antibiotics will be prescribed to be taken during the prep day before surgery.         1. Adenomatous polyp of ascending colon          Patient or patient representative verbalized consent for the use of Ambient Listening during the visit with  Marizol Valladares MD for chart documentation. 2/5/2025  12:32 EST     Electronically signed by Marizol Valladares MD at 02/05/25 1232                 Marizol Valladares MD at 01/29/25 1345          Barb Clinton is a 59 y.o. female who is seen as a consult at the request of Dr. Dawson for evaluation of colon polyp    HPI:  History of Present Illness  The patient is a 59-year-old female who underwent a colonoscopy several weeks ago and was found to have a very flat polyp in the cecum. She also had one at the hepatic flexure. The flat polyp in the cecum could not be removed but was biopsied and came back as tubular adenoma. The hepatic flexure polyp also came back as tubular adenoma. She comes to discuss surgical options.    She reports experiencing intermittent sharp, stabbing pain for approximately one year, which she has been attributing to gas pains. She expresses a desire to expedite the surgical process due to her active lifestyle, which includes frequent motorcycle riding with her .    She also mentions the presence of four clamps and seeks clarification on the duration she is required to carry the associated card.    MEDICATIONS  Past: gabapentin       Past Medical History:   Diagnosis Date    Arthritis     DDD NECK LOWER LUMBAR SPINE, DDD T SPINE. PAIN IN HANDS    Asthma     Colon polyp 12/20/24    Family history of colon cancer     FATHER    Hypercholesterolemia      MEDS IN THE PAST. NO CURRENTLY    Hypertension     HISTORY OF..HAS NOT NEEDED MEDICINES RECENTLY    Restless leg     Subclinical hyperthyroidism 2024    Vitamin D deficiency        Past Surgical History:   Procedure Laterality Date    COLONOSCOPY      ENDOSCOPY      MULTIPLE TOOTH EXTRACTIONS      COMPLETE UPPER AND LOWER       Social History:   reports that she quit smoking about 7 years ago. Her smoking use included cigarettes. She started smoking about 46 years ago. She has a 39.7 pack-year smoking history. She has never been exposed to tobacco smoke. She has never used smokeless tobacco. She reports current alcohol use of about 2.0 standard drinks of alcohol per week. She reports that she does not use drugs.      Marriage status:     Family History   Problem Relation Age of Onset    Thyroid disease Mother     Hypertension Mother     Kidney disease Mother     Asthma Mother     Cancer Father          on 10/29/2002 with Colon Cancer    Asthma Daughter     Asthma Son     Diabetes Son     Learning disabilities Son     Mental retardation Son     Thyroid disease Maternal Aunt     Arthritis Maternal Uncle     Arthritis Maternal Grandmother     Malig Hyperthermia Neg Hx          Current Outpatient Medications:     albuterol sulfate  (90 Base) MCG/ACT inhaler, Inhale 2 puffs Every 4 (Four) Hours As Needed for Wheezing. (Patient taking differently: Inhale 2 puffs Every 4 (Four) Hours As Needed for Wheezing (RESCUE INHALER).), Disp: 18 g, Rfl: 5    cyclobenzaprine (FLEXERIL) 10 MG tablet, Take 1 tablet by mouth every night at bedtime., Disp: 90 tablet, Rfl: 1    diclofenac (VOLTAREN) 75 MG EC tablet, Take 1 tablet by mouth 2 (Two) Times a Day. (Patient taking differently: Take 1 tablet by mouth 2 (Two) Times a Day. WILL HOLD FOR SURGERY), Disp: 180 tablet, Rfl: 0    pramipexole (MIRAPEX) 0.5 MG tablet, TAKE 1 TABLET BY MOUTH AT NIGHT, Disp: 90 tablet, Rfl: 1    Chlorhexidine Gluconate 4 %  solution, Apply 1 Application topically to the appropriate area as directed 2 (Two) Times a Day. Shower With Hibiclens Solution Twice The Day Before Surgery, Disp: 236 mL, Rfl: 0    ciprofloxacin (CIPRO) 500 MG tablet, Take at 8 pm the day before surgery (Patient taking differently: See Admin Instructions. Take at 8 pm the day before surgery), Disp: 1 tablet, Rfl: 0    metroNIDAZOLE (FLAGYL) 500 MG tablet, Take 1 pill at 8 pm and then at 10 pm the day before surgery (Patient taking differently: See Admin Instructions. Take 1 pill at 8 pm and then at 10 pm the day before surgery), Disp: 2 tablet, Rfl: 0    Allergy  Patient has no known allergies.      Vitals:    01/29/25 1326   BP: 132/88   Pulse: 88   SpO2: 96%     Body mass index is 29.29 kg/m².      Physical Exam    Physical Exam  Constitutional:       General: She is not in acute distress.     Appearance: She is well-developed.   HENT:      Head: Normocephalic and atraumatic.   Abdominal:      General: There is no distension.      Palpations: Abdomen is soft.   Musculoskeletal:         General: Normal range of motion.   Neurological:      Mental Status: She is alert.   Psychiatric:         Thought Content: Thought content normal.         Review of Medical Record:  I reviewed medical records as detailed in HPI.     Assessment & Plan  1. Tubular adenoma.  The patient underwent a colonoscopy several weeks ago, which revealed a very flat polyp in the cecum and another at the hepatic flexure. Both polyps were biopsied and came back as tubular adenomas. The cecal polyp could not be removed during the colonoscopy. A detailed discussion was held regarding the nature of polyps, their potential for malignancy, and the associated risks. The patient was informed that tubular adenomas are the lowest risk for turning into cancer. Given the size of the polyp (less than 1 cm), there is a 10-15% chance of cancerous changes. A partial right colectomy, also known as a cecectomy,  was recommended. This procedure involves removing the cecum, appendix, and a small portion of the small intestine, followed by reattachment of the small intestine to the right colon. The surgery will be performed laparoscopically using the Da Nilda robot and is expected to take 2-3 hours. The patient will need to stay in the hospital for 1-2 nights, depending on recovery. Post-operative care includes an abdominal wall block for pain management, followed by a cocktail of medications including Tylenol, Celebrex, and gabapentin. The patient was advised to avoid driving and lifting anything heavier than 15 pounds for the first two weeks post-surgery. She was also instructed to avoid forcing food intake and to remain active by sitting up in a chair and walking around the house four times daily. The patient was informed about the low risk of infection (less than 10%) and the rare need for an ostomy bag (less than 2%). She was advised to contact the office immediately if she experiences fever, increased pain, or drainage from the incision site. Two antibiotics will be prescribed to be taken during the prep day before surgery.         1. Adenomatous polyp of ascending colon          Patient or patient representative verbalized consent for the use of Ambient Listening during the visit with  Marizol Valladares MD for chart documentation. 2/5/2025  12:32 EST     Electronically signed by Marizol Valladares MD at 02/05/25 1232          Operative/Procedure Notes (all)        Marizol Valladares MD at 02/11/25 4529  Version 1 of 1             Surgeon: Marizol Valladares MD    Surgical  Assistant: Alba Bermeo PA-C     Preoperative diagnosis: Adenomatous polyp of ascending colon [D12.2]    Post-Op Diagnosis Codes:     * Adenomatous polyp of ascending colon [D12.2]    Procedure: laparoscopic robotic assisted right colectomy, * Panel 2 does not exist *    Estimated Blood Loss: 250 mL    Specimens:   Specimens       ID Source Type Tests  Collected By Collected At Frozen?    A Large Intestine, Right / Ascending Colon Tissue TISSUE PATHOLOGY EXAM   Marizol Valladares MD 2/11/25 1118 No    Description: RIGHT COLON    This specimen was not marked as sent.           Order Name Source Comment Collection Info Order Time   TISSUE PATHOLOGY EXAM Large Intestine, Right / Ascending Colon  Collected By: Marizol Valladares MD 2/11/2025 11:30 AM     Release to patient   Routine Release            Indication:  Barb Clinton is a 59 y.o. female who comes in with Adenomatous polyp of ascending colon  Patient understands risks, benefits,and alternatives wishes to proceed.      Procedure Details:  The patient was brought to the operating room, had SCDs in place, antibiotics infused. After general anesthesia was achieved, TAP block was done, and then the patient was prepped and draped in sterile fashion. 0.25% Marcaine with epinephrine was used for local infiltration at the trocar sites. First, I made a incision just right under the ribs then used a Veress needle to gain access into the abdomen. The abdomen was Insufflated up to 12 mmHg. I then placed an 8 mm trocar into the abdomen. I was able to place 3 other trocars all in a diagonal approximately 9 cm apart going to the right side of midline at the level of the ASIS. I was able to place an assistant port, 8 mm in the left lower quadrant. The patient was tilted slight at 6 degrees right side up.  I docked the robot and placed all the instruments in under direct visualization.   I was able to lift the transverse colon up, identified the ileocolic vessel, and dissected the space between the IC vessels and the duodenum.  I took the ileocolic with the vessel sealer, sweeping the duodenum out of the way. I was able to develop the plane to mobilize the colon medial to lateral. Once I got the colon completely mobilized along the white line of Toldt, I was able to take the omentum off the transverse colon. I then used the  "vessel sealer to take the right branch of the middle colic artery, and then take the mesentery with the vessel sealer. I took the mesentery of the distal terminal ileum.  I placed a 60 mm blue load of the stapler 60 mm approximately 5 cm from the ileocecal valve.  I had anesthesia give 3 mL of ICG to delineate the blood supply of the transverse colon.  Approximately a centimeter from the demarcated area on the profuse side I used another load of the 60 mm blue stapler to go across the transverse colon.  I moved the specimen over the liver.  I brought the terminal ileum to the transverse colon, and in an isoperistaltic fashion did the anastomosis making an enterotomy in the small intestine and the transverse colon, and then stapling to make the common channel.  I closed the common enterotomy using a 6\" Stratafix first layer in Christopher fashion and then Lembert at the second layer.  Tisseel was placed on all the staple lines and the suture line.  I extended the left upper quadrant 12 mm trocar site.  I entered the abdomen and placed a medium Abilio wound retractor to protect the abdominal wall.  I brought the specimen out and then remove the Abilio wound retractor.  I then closed the posterior then anterior fascia with separate #1 PDS.  The subcutaneous tissue was irrigated out with saline.  We used a 4-0 Monocryl in a subcuticular fashion to close the skin.  Skin glue was used as dressing. All instrument, lap counts, needle counts were correct. Patient was stable throughout the entire case.     This procedure was not performed to treat colon cancer through resection       Assistant: Alba Bermeo PA-C was responsible for performing the following activities: Retraction, Suction, Irrigation, Suturing, Closing and Placing Dressing and their skilled assistance was necessary for the success of this case    Electronically signed by Marizol Valladares MD at 02/11/25 7683          Physician Progress Notes (all)    "     Jordyn Toro PA-C at 02/12/25 0693          Colorectal Surgery Progress Note    POD 1     S: positive flatus,  positive BM. negative ambulating.  Reports some significant right lower quadrant abdominal pain that has improved somewhat with Dilaudid.  Tolerating full liquids. Urinating without difficulty.    O:  Temp:  [97.7 °F (36.5 °C)-98.8 °F (37.1 °C)] 98.8 °F (37.1 °C)  Heart Rate:  [] 106  Resp:  [14-18] 18  BP: ()/(50-73) 116/66    Intake/Output Summary (Last 24 hours) at 2/12/2025 0906  Last data filed at 2/12/2025 0620  Gross per 24 hour   Intake 2398 ml   Output 3650 ml   Net -1252 ml     Abd: soft, non-distended.  Incisions: clean, dry, intact, no erythema    Results from last 7 days   Lab Units 02/12/25  0254   WBC 10*3/mm3 11.48*   HEMOGLOBIN g/dL 10.7*   HEMATOCRIT % 33.5*   PLATELETS 10*3/mm3 219     Results from last 7 days   Lab Units 02/12/25  0253   SODIUM mmol/L 136   POTASSIUM mmol/L 4.3   CHLORIDE mmol/L 103   CO2 mmol/L 21.3*   BUN mg/dL 10   CREATININE mg/dL 0.93   EGFR mL/min/1.73 70.9   GLUCOSE mg/dL 110*   CALCIUM mg/dL 9.2     Results from last 7 days   Lab Units 02/12/25  0253   MAGNESIUM mg/dL 1.9     A/P: 59 y.o. female s/p laparoscopic robotic assisted right colectomy  Advance diet to regular  Encouraged ambulation and sitting up in chair  Likely home later today or tomorrow, once tolerating regular diet and having bowel movements  Will try dose of Ativan for right lower quadrant abdominal pain    Electronically signed by Jordyn Toro PA-C at 02/12/25 0957

## 2025-02-12 NOTE — OUTREACH NOTE
Prep Survey      Flowsheet Row Responses   [facility] patient discharged from? [place]   Is LACE score < 7 ? Yes   Eligibility [facility]   Date of Admission 02/11/25   Date of Discharge 02/12/25   Discharge Disposition Home or Self Care   Discharge diagnosis laparoscopic robot-assisted right colectomy lap   Does the patient have one of the following disease processes/diagnoses(primary or secondary)? General Surgery   Does the patient have Home health ordered? No   Is there a DME ordered? No   Prep survey completed? Yes            HANNAH FRAUSTO - Registered Nurse

## 2025-02-12 NOTE — PROGRESS NOTES
CRS attending  Patient says she feels well  She has had 2 bowel movements  Abdomen is soft    Status post laparoscopic robot-assisted right colectomy lap  Reviewed pathology with patient which shows tubulovillous adenoma without any high-grade dysplasia.  Plan for colonoscopy in 2 years.  No further therapy needed.  Patient safe for discharge.  Rx sent for gabapentin.   went downstairs to pick it up.

## 2025-02-13 ENCOUNTER — TRANSITIONAL CARE MANAGEMENT TELEPHONE ENCOUNTER (OUTPATIENT)
Dept: CALL CENTER | Facility: HOSPITAL | Age: 60
End: 2025-02-13
Payer: COMMERCIAL

## 2025-02-13 NOTE — CASE MANAGEMENT/SOCIAL WORK
Case Management Discharge Note      Final Note: Home, family to transport         Selected Continued Care - Discharged on 2/12/2025 Admission date: 2/11/2025 - Discharge disposition: Home or Self Care      Destination    No services have been selected for the patient.                Durable Medical Equipment    No services have been selected for the patient.                Dialysis/Infusion    No services have been selected for the patient.                Home Medical Care    No services have been selected for the patient.                Therapy    No services have been selected for the patient.                Community Resources    No services have been selected for the patient.                Community & DME    No services have been selected for the patient.                         Final Discharge Disposition Code: 01 - home or self-care

## 2025-02-13 NOTE — OUTREACH NOTE
Call Center TCM Note      Flowsheet Row Responses   [facility] patient discharged from? [place]   Does the patient have one of the following disease processes/diagnoses(primary or secondary)? General Surgery   TCM attempt successful? Yes   Call start time 1109   Call end time 1112   Discharge diagnosis laparoscopic robot-assisted right colectomy lap   Person spoke with today (if not patient) and relationship Mother   Meds reviewed with patient/caregiver? Yes   Is the patient having any side effects they believe may be caused by any medication additions or changes? No   Does the patient have all medications related to this admission filled (includes all antibiotics, pain medications, etc.) Yes   Is the patient taking all medications as directed (includes completed medication regime)? Yes   Comments post-op 3/3/25   Does the patient have an appointment with their PCP within 7-14 days of discharge? No   Nursing Interventions Patient desires to follow up with specialty only   Psychosocial issues? No   Did the patient receive a copy of their discharge instructions? Yes   Nursing interventions Reviewed instructions with patient   What is the patient's perception of their health status since discharge? Improving   Nursing interventions Nurse provided patient education   Is the patient /caregiver able to teach back basic post-op care? Take showers only when approved by MD-sponge bathe until then, No tub bath, swimming, or hot tub until instructed by MD, Keep incision areas clean,dry and protected, Lifting as instructed by MD in discharge instructions   Is the patient/caregiver able to teach back signs and symptoms of incisional infection? Fever, Pus or odor from incision, Incisional warmth, Increased drainage or bleeding, Increased redness, swelling or pain at the incisonal site   Is the patient/caregiver able to teach back steps to recovery at home? Rest and rebuild strength, gradually increase activity, Eat a  well-balance diet   Is the patient/caregiver able to teach back the hierarchy of who to call/visit for symptoms/problems? PCP, Specialist, Home health nurse, Urgent Care, ED, 911 Yes   TCM call completed? Yes   Wrap up additional comments Mother states pt is doing good, and denies any increased redness. swelling, drainage at incisional site. Reviewed AVS/meds/instructions with Mother. Mother verified post-op appt   Call end time 1112   Would this patient benefit from a Referral to [facility] Social Work? No   Is the patient interested in additional calls from an ambulatory ? No            Shefali Batista RN    2/13/2025, 11:13 EST

## 2025-02-13 NOTE — DISCHARGE SUMMARY
Date of Admission: 2/11/2025  Date of Discharge:  2/12/2025    Presenting Problem/History of Present Illness  Adenomatous polyp of ascending colon [D12.2]  Colon polyp [K63.5]     Discharge Diagnosis:   Past Medical History:   Diagnosis Date    Arthritis     DDD NECK LOWER LUMBAR SPINE, DDD T SPINE. PAIN IN HANDS    Asthma     Colon polyp 12/20/24    Family history of colon cancer     FATHER    Hypercholesterolemia     MEDS IN THE PAST. NO CURRENTLY    Hypertension     HISTORY OF..HAS NOT NEEDED MEDICINES RECENTLY    Restless leg     Subclinical hyperthyroidism 03/22/2024    Vitamin D deficiency        Procedures Performed  Procedure(s):  laparoscopic robotic assisted right colectomy      Hospital Course  Patient is a 59 y.o. female who presented with the above diagnosis, underwent the above procedure, and made an uneventful recovery. The patient's diet was advanced from clears to regular and tolerated well.  The Morel was removed and the patient voided without difficulty.   she was having good bowel function.      Consults:   Consults       No orders found from 1/13/2025 to 2/12/2025.              Discharge Disposition  Home or Self Care    Discharge Medications     Discharge Medications        New Medications        Instructions Start Date   acetaminophen 500 MG tablet  Commonly known as: TYLENOL   1,000 mg, Oral, Every 6 Hours      gabapentin 400 MG capsule  Commonly known as: NEURONTIN   400 mg, Oral, Every 8 Hours Scheduled             Continue These Medications        Instructions Start Date   albuterol sulfate  (90 Base) MCG/ACT inhaler  Commonly known as: PROVENTIL HFA;VENTOLIN HFA;PROAIR HFA   2 puffs, Inhalation, Every 4 Hours PRN      diclofenac 75 MG EC tablet  Commonly known as: VOLTAREN   75 mg, Oral, 2 Times Daily      esomeprazole 20 MG capsule  Commonly known as: nexIUM   20 mg, Every Morning Before Breakfast      pramipexole 0.5 MG tablet  Commonly known as: MIRAPEX   0.5 mg, Oral, Every  Night at Bedtime             Stop These Medications      cyclobenzaprine 10 MG tablet  Commonly known as: FLEXERIL              Discharge Diet: Regular    Activity at Discharge:   Activity Instructions       Driving Restrictions      Type of Restriction: Driving    Driving Restrictions: No Driving Until Next Appointment    Gradually Increase Activity Until at Pre-Hospitalization Level      Lifting Restrictions      Type of Restriction: Lifting    Lifting Restrictions: Lifting Restriction (Indicate Limit)    Weight Limit (Pounds): 15    Length of Lifting Restriction: until office appt            Follow-up Appointments  Future Appointments   Date Time Provider Department Center   3/3/2025  1:30 PM Alba Bermeo PA-C [facility]  [facility]   3/14/2025  9:30 AM Keaton Snow APRN [facility]   3/21/2025 11:00 AM Coni Dobbins APRN [facility]  [facility]     Additional Instructions for the Follow-ups that You Need to Schedule       Call MD With Problems / Concerns   As directed      Instructions: temp >101. Drainage from wound. vomitting.    Order Comments: Instructions: temp >101. Drainage from wound. vomitting.         Discharge Follow-up with Specified Provider: Blaine; 2 Weeks   As directed      To: Blaine   Follow Up: 2 Weeks

## 2025-02-14 NOTE — PAYOR COMM NOTE
"Barb Clinton (59 y.o. Female)      PATIENT DISCHARGED 02/12/2025:  REF#   [id number]      UR:  [phone],  [phone]     [facility]: NPI [id number]  [facility] [id number]     VINCENZO BHATT RN,[facility]       Date of Birth   1965    Social Security Number       Address   [address] [facility] [address] [place] [id number]    Home Phone   [phone]    MRN   [id number]       [facility]    Marital Status                               Admission Date   2/11/25    Admission Type   Elective    Admitting Provider   Marizol Valladares MD    Attending Provider       Department, Room/Bed   [address], [address]/1       Discharge Date   2/12/2025    Discharge Disposition   Home or Self Care    Discharge Destination                                 Attending Provider: (none)   Allergies: No Known Allergies    Isolation: None   Infection: None   Code Status: Prior    Ht: 165.1 cm (65\")   Wt: 85.4 kg (188 lb 3.2 oz)    Admission Cmt: None   Principal Problem: Adenomatous polyp of ascending colon [D12.2]                   Active Insurance as of 2/11/2025       Primary Coverage       Payor Plan Insurance Group Employer/Plan Group    [facility] [id number]       Payor Plan Address Payor Plan Phone Number Payor Plan Fax Number Effective Dates    [address]   11/1/2019 - None Entered    [place] [address]         Subscriber Name Subscriber Birth Date Member ID       ALENA CLINTON 4/28/1962 [id number]                     Emergency Contacts        (Rel.) Home Phone Work Phone Mobile Phone    UBALDO NYET (Mother) [phone] -- [phone]    ALENA CLINTON (Spouse) -- -- [phone]                 Discharge Summary        Jordyn Toro PA-C at 02/12/25 1730          Date of Admission: 2/11/2025  Date of Discharge:  2/12/2025    Presenting Problem/History of Present Illness  Adenomatous polyp of ascending colon [D12.2]  Colon polyp " [K63.5]     Discharge Diagnosis:   Past Medical History:   Diagnosis Date    Arthritis     DDD NECK LOWER LUMBAR SPINE, DDD T SPINE. PAIN IN HANDS    Asthma     Colon polyp 12/20/24    Family history of colon cancer     FATHER    Hypercholesterolemia     MEDS IN THE PAST. NO CURRENTLY    Hypertension     HISTORY OF..HAS NOT NEEDED MEDICINES RECENTLY    Restless leg     Subclinical hyperthyroidism 03/22/2024    Vitamin D deficiency        Procedures Performed  Procedure(s):  laparoscopic robotic assisted right colectomy      Hospital Course  Patient is a 59 y.o. female who presented with the above diagnosis, underwent the above procedure, and made an uneventful recovery. The patient's diet was advanced from clears to regular and tolerated well.  The Morel was removed and the patient voided without difficulty.   she was having good bowel function.      Consults:   Consults       No orders found from 1/13/2025 to 2/12/2025.              Discharge Disposition  Home or Self Care    Discharge Medications     Discharge Medications        New Medications        Instructions Start Date   acetaminophen 500 MG tablet  Commonly known as: TYLENOL   1,000 mg, Oral, Every 6 Hours      gabapentin 400 MG capsule  Commonly known as: NEURONTIN   400 mg, Oral, Every 8 Hours Scheduled             Continue These Medications        Instructions Start Date   albuterol sulfate  (90 Base) MCG/ACT inhaler  Commonly known as: PROVENTIL HFA;VENTOLIN HFA;PROAIR HFA   2 puffs, Inhalation, Every 4 Hours PRN      diclofenac 75 MG EC tablet  Commonly known as: VOLTAREN   75 mg, Oral, 2 Times Daily      esomeprazole 20 MG capsule  Commonly known as: nexIUM   20 mg, Every Morning Before Breakfast      pramipexole 0.5 MG tablet  Commonly known as: MIRAPEX   0.5 mg, Oral, Every Night at Bedtime             Stop These Medications      cyclobenzaprine 10 MG tablet  Commonly known as: FLEXERIL              Discharge Diet: Regular    Activity at  Discharge:   Activity Instructions       Driving Restrictions      Type of Restriction: Driving    Driving Restrictions: No Driving Until Next Appointment    Gradually Increase Activity Until at Pre-Hospitalization Level      Lifting Restrictions      Type of Restriction: Lifting    Lifting Restrictions: Lifting Restriction (Indicate Limit)    Weight Limit (Pounds): 15    Length of Lifting Restriction: until office appt            Follow-up Appointments  Future Appointments   Date Time Provider Department Center   3/3/2025  1:30 PM Alba Bermeo PA-C [facility]  [facility]   3/14/2025  9:30 AM Keaton Snow APRN [facility]   3/21/2025 11:00 AM Coni Dobbins APRN [facility]  [facility]     Additional Instructions for the Follow-ups that You Need to Schedule       Call MD With Problems / Concerns   As directed      Instructions: temp >101. Drainage from wound. vomitting.    Order Comments: Instructions: temp >101. Drainage from wound. vomitting.         Discharge Follow-up with Specified Provider: Blaine; 2 Weeks   As directed      To: Blaine   Follow Up: 2 Weeks                  Electronically signed by Jordyn Toro PA-C at 02/13/25 1050       Discharge Order (From admission, onward)       Start     Ordered    02/12/25 1657  Discharge patient  Once        Expected Discharge Date: 02/12/25   Discharge Disposition: Home or Self Care   Physician of Record for Attribution - Please select from Treatment Team: WENCESLAO VELASQUEZ [[id number]]   Review needed by CMO to determine Physician of Record: No      Question Answer Comment   Physician of Record for Attribution - Please select from Treatment Team WENCESLAO VELASQUEZ    Review needed by CMO to determine Physician of Record No        02/12/25 1659                  Jordyn Edwards     Case Management     Case Management/Social Work     Signed     Date of Service: 02/12/25 1406  Creation Time: 02/12/25 1406     Signed         [facility] Assessment  [facility]      Patient Name: Barb Clinton               MRN: [id number]  Today's Date: 2/12/2025                Admit Date: 2/11/2025     Plan: Return home via spouse. Denies needs    Discharge Needs Assessment         Row Name 02/12/25 1403           Living Environment     People in Home child(johana), adult;spouse     Current Living Arrangements home     Primary Care Provided by self     Provides Primary Care For child(johana)     Family Caregiver if Needed spouse     Able to Return to Prior Arrangements yes           Resource/Environmental Concerns     Transportation Concerns none           Transition Planning     Patient/Family Anticipates Transition to home with family           Discharge Needs Assessment     Equipment Currently Used at Home none     Concerns to be Addressed no discharge needs identified     Anticipated Changes Related to Illness none                         Discharge Plan         Row Name 02/12/25 1407           Plan     Plan Return home via spouse. Denies needs     Patient/Family in Agreement with Plan yes     Plan Comments CCP met with patient at bedside. Introduced self and explained role. Patient lives with her  and their disabled adult son. She is IADL. uses no DME, and drives herself to appts. She denies any AD/LW documents. She sees Keaton Snow for PCP. She denies any HH/[facility] history. At discharge she plans to return home via her . She denies any discharge planning needs. Sandra HOROWITZ RN/CCP                       Continued Care and Services - Admitted Since 2/11/2025    No active coordination exists for this encounter.              Demographic Summary         Row Name 02/12/25 1408           General Information     Admission Type inpatient     Arrived From home     Referral Source admission list     Reason for Consult discharge planning                         Functional Status         Row Name 02/12/25 1409           Functional Status     Usual Activity Tolerance good     Current Activity  Tolerance good           Functional Status, IADL     Medications independent     Meal Preparation independent     Housekeeping independent     Laundry independent     Shopping independent                         Psychosocial    No documentation.                        Abuse/Neglect    No documentation.                        Legal    No documentation.                        Substance Abuse    No documentation.                        Patient Forms    No documentation.                            Jordyn Edwards

## 2025-03-03 ENCOUNTER — OFFICE VISIT (OUTPATIENT)
Dept: SURGERY | Facility: CLINIC | Age: 60
End: 2025-03-03
Payer: COMMERCIAL

## 2025-03-03 VITALS
HEART RATE: 86 BPM | OXYGEN SATURATION: 98 % | WEIGHT: 176.8 LBS | DIASTOLIC BLOOD PRESSURE: 80 MMHG | BODY MASS INDEX: 29.46 KG/M2 | HEIGHT: 65 IN | SYSTOLIC BLOOD PRESSURE: 134 MMHG

## 2025-03-03 DIAGNOSIS — D12.2 ADENOMATOUS POLYP OF ASCENDING COLON: Primary | ICD-10-CM

## 2025-03-03 PROCEDURE — 99024 POSTOP FOLLOW-UP VISIT: CPT | Performed by: PHYSICIAN ASSISTANT

## 2025-03-03 NOTE — PROGRESS NOTES
"Barb Clinton is a 59 y.o. female in for follow up of Adenomatous polyp of ascending colon    Pt received a positive Cologuard result 09/18/2024. She then had a colonoscopy performed by Dr. Dawson on 12/20/2024, which showed a >1 cm unresectable cecal polyp (Bx = tubular adenoma) and an 18 mm tubular adenoma within the hepatic flexure (removed piecemeal). She is S/p robotic-assisted laparoscopic right-hemicolectomy 02/11/2025 (Pathology: Tubulovillous adenoma of the cecum (4.5 cm) involving the appendiceal orifice & residual tubular adenoma at the hepatic flexure). Pt presents today for a post-op evaluation.     Pt reports little to no post-op pain.  States she feels a little abdominal tenderness occasionally if she bends, but this is tolerable.   She endorses a good appetite and energy levels.   She has avoided lifted >15 lbs as instructed, but is eager to get back to riding her bike.     Typically has 3-4 bowel movements a day (which was her normal before surgery), but does state that her stool consistency is looser.   Stool was \"mushy\" initially following surgery.   It is gradually getting firmer, but still is not as formed as it was prior to surgery.   She denies any RB, melena, nausea, vomiting, or fevers.  She denies any concerns at this time.     /80 (BP Location: Left arm, Patient Position: Sitting, Cuff Size: Adult)   Pulse 86   Ht 165.1 cm (65\")   Wt 80.2 kg (176 lb 12.8 oz)   SpO2 98%   Breastfeeding No   BMI 29.42 kg/m²   Body mass index is 29.42 kg/m².      PE:   Physical Exam  Constitutional:       General: She is not in acute distress.     Appearance: She is well-developed.   HENT:      Head: Normocephalic and atraumatic.   Abdominal:      General: There is no distension.      Palpations: Abdomen is soft.      Comments: Abdomen soft, non-distended. Incisions C/D/I without surrounding erythema, edema, or purulent drainage.    Musculoskeletal:         General: Normal range of motion. "   Neurological:      Mental Status: She is alert.   Psychiatric:         Thought Content: Thought content normal.       Review of Medical Record:     Cologuard 09/18/2024:  - Positive    Colonoscopy 12/20/2024:  - Indication: Positive Cologuard Test/FHx of colon cancer  - >1 cm unresectable polyp, cecum. (Path: tubular adenoma)  - 18 mm tubular adenoma, hepatic flexure  Diverticulosis  - Non-bleeding hemorrhoids  - Dr. Kim Dawson, [facility]    FHx:  - Father: Hx of colon cancer (Dx age 59)    Assessment:   1. Adenomatous polyp of ascending colon    - S/p robotic-assisted laparoscopic right-hemicolectomy 02/11/2025  - Pathology: Tubulovillous adenoma of the cecum (4.5 cm) involving the appendiceal orifice & residual tubular adenoma at the hepatic flexure    Plan:  - Pt is healing exceptionally well following her recent surgery.   - Recommended starting fiber (1-2 doses/day) to help optimize stool consistency  - Gradually increase physical activity as tolerated  - Reviewed surgical pathology with Pt and recommendations to repeat a colonoscopy in 12/2026.  - Follow up with Dr. Valladares in 3-4 weeks.

## 2025-03-14 ENCOUNTER — OFFICE VISIT (OUTPATIENT)
Dept: FAMILY MEDICINE CLINIC | Facility: CLINIC | Age: 60
End: 2025-03-14
Payer: COMMERCIAL

## 2025-03-14 VITALS
RESPIRATION RATE: 19 BRPM | HEIGHT: 65 IN | HEART RATE: 88 BPM | TEMPERATURE: 97.4 F | SYSTOLIC BLOOD PRESSURE: 156 MMHG | DIASTOLIC BLOOD PRESSURE: 90 MMHG | WEIGHT: 173.2 LBS | OXYGEN SATURATION: 98 % | BODY MASS INDEX: 28.86 KG/M2

## 2025-03-14 DIAGNOSIS — G25.81 RESTLESS LEGS: ICD-10-CM

## 2025-03-14 DIAGNOSIS — M19.90 OSTEOARTHRITIS, UNSPECIFIED OSTEOARTHRITIS TYPE, UNSPECIFIED SITE: ICD-10-CM

## 2025-03-14 DIAGNOSIS — J30.9 ALLERGIC RHINITIS, UNSPECIFIED SEASONALITY, UNSPECIFIED TRIGGER: ICD-10-CM

## 2025-03-14 DIAGNOSIS — F33.0 MAJOR DEPRESSIVE DISORDER, RECURRENT, MILD: Primary | ICD-10-CM

## 2025-03-14 RX ORDER — PRAMIPEXOLE DIHYDROCHLORIDE 0.5 MG/1
0.5 TABLET ORAL
Qty: 90 TABLET | Refills: 1 | Status: SHIPPED | OUTPATIENT
Start: 2025-03-14

## 2025-03-14 RX ORDER — MONTELUKAST SODIUM 10 MG/1
10 TABLET ORAL NIGHTLY
Qty: 90 TABLET | Refills: 0 | Status: SHIPPED | OUTPATIENT
Start: 2025-03-14

## 2025-03-14 RX ORDER — CITALOPRAM HYDROBROMIDE 20 MG/1
20 TABLET ORAL DAILY
Qty: 90 TABLET | Refills: 1 | Status: SHIPPED | OUTPATIENT
Start: 2025-03-14

## 2025-03-14 RX ORDER — DICLOFENAC SODIUM 75 MG/1
75 TABLET, DELAYED RELEASE ORAL 2 TIMES DAILY
Qty: 180 TABLET | Refills: 0 | Status: SHIPPED | OUTPATIENT
Start: 2025-03-14

## 2025-03-14 NOTE — PROGRESS NOTES
"Chief Complaint  Cough (At night ), Wheezing, Depression (A lot of depression lately several deaths in the family and still recovering from surgery ), and Weight Gain    Subjective         Barb Clinton presents to [facility]  Presents to the office for a 6-month follow-up.  She states that she has recently been under a great deal of stress as her father passed away, her mother is struggling with her health.  The patient recently had surgery and she has had a couple friends passed away.  Patient states that she also is the caregiver to her handicapped son.  Patient states that she has been more stressed and often finds herself sitting there crying.  Did discuss placing her on an SSRI to help reduce the symptoms.  She does verbalize understanding.  She also complains of increased nasal drainage sinus pressure.  She states that this does affect her breathing occasionally.  She states that she was on Singulair at 1 time.  I did discuss starting her back on this medication.    Cough  Associated symptoms include wheezing.   Wheezing   Associated symptoms include coughing.   Depression  Her past medical history is significant for depression.        Objective     Vitals:    03/14/25 0928   BP: 156/90   Pulse: 88   Resp: 19   Temp: 97.4 °F (36.3 °C)   SpO2: 98%   Weight: 78.6 kg (173 lb 3.2 oz)   Height: 165.1 cm (65\")      Body mass index is 28.82 kg/m².    BMI is >= 25 and <30. (Overweight) The following options were offered after discussion;: nutrition counseling/recommendations        Physical Exam  Vitals reviewed.   Constitutional:       Appearance: Normal appearance.   HENT:      Nose: Congestion present.   Cardiovascular:      Rate and Rhythm: Normal rate and regular rhythm.      Pulses: Normal pulses.      Heart sounds: Normal heart sounds, S1 normal and S2 normal. No murmur heard.  Pulmonary:      Effort: Pulmonary effort is normal. No respiratory distress.      Breath sounds: " Normal breath sounds.   Skin:     General: Skin is warm and dry.   Neurological:      Mental Status: She is alert and oriented to person, place, and time.   Psychiatric:         Attention and Perception: Attention normal.         Mood and Affect: Mood normal.         Behavior: Behavior normal.          Result Review :   The following data was reviewed by: MICHAELA Newby on 03/14/2025:      Procedures    Assessment and Plan   Diagnoses and all orders for this visit:    1. Major depressive disorder, recurrent, mild (Primary)  -     citalopram (CeleXA) 20 MG tablet; Take 1 tablet by mouth Daily.  Dispense: 90 tablet; Refill: 1    2. Osteoarthritis, unspecified osteoarthritis type, unspecified site  -     diclofenac (VOLTAREN) 75 MG EC tablet; Take 1 tablet by mouth 2 (Two) Times a Day.  Dispense: 180 tablet; Refill: 0    3. Restless legs  -     pramipexole (MIRAPEX) 0.5 MG tablet; Take 1 tablet by mouth every night at bedtime.  Dispense: 90 tablet; Refill: 1    4. Allergic rhinitis, unspecified seasonality, unspecified trigger  -     montelukast (Singulair) 10 MG tablet; Take 1 tablet by mouth Every Night.  Dispense: 90 tablet; Refill: 0      I did discuss following up with the patient in 3 months to further evaluate her mental health as well as her blood pressure.  Did ask her to check her blood pressure on a routine basis to keep a log for our review.  I also asked her to let us know if there is any changes in her mental status or worsening of her blood pressure.    Follow Up   Return in about 3 months (around 6/14/2025) for Recheck.  Patient was given instructions and counseling regarding her condition or for health maintenance advice. Please see specific information pulled into the AVS if appropriate.

## 2025-03-21 ENCOUNTER — OFFICE VISIT (OUTPATIENT)
Dept: ENDOCRINOLOGY | Age: 60
End: 2025-03-21
Payer: COMMERCIAL

## 2025-03-21 VITALS
WEIGHT: 175.8 LBS | DIASTOLIC BLOOD PRESSURE: 82 MMHG | SYSTOLIC BLOOD PRESSURE: 136 MMHG | TEMPERATURE: 97.9 F | HEIGHT: 65 IN | HEART RATE: 84 BPM | OXYGEN SATURATION: 97 % | BODY MASS INDEX: 29.29 KG/M2

## 2025-03-21 DIAGNOSIS — E05.90 SUBCLINICAL HYPERTHYROIDISM: Primary | ICD-10-CM

## 2025-03-21 LAB
ALBUMIN SERPL-MCNC: 4 G/DL (ref 3.5–5.2)
ALBUMIN/GLOB SERPL: 1.3 G/DL
ALP SERPL-CCNC: 112 U/L (ref 39–117)
ALT SERPL-CCNC: 20 U/L (ref 1–33)
AST SERPL-CCNC: 18 U/L (ref 1–32)
BASOPHILS # BLD AUTO: 0.05 10*3/MM3 (ref 0–0.2)
BASOPHILS NFR BLD AUTO: 0.9 % (ref 0–1.5)
BILIRUB SERPL-MCNC: 0.2 MG/DL (ref 0–1.2)
BUN SERPL-MCNC: 15 MG/DL (ref 6–20)
BUN/CREAT SERPL: 21.7 (ref 7–25)
CALCIUM SERPL-MCNC: 10.1 MG/DL (ref 8.6–10.5)
CHLORIDE SERPL-SCNC: 100 MMOL/L (ref 98–107)
CO2 SERPL-SCNC: 28.6 MMOL/L (ref 22–29)
CREAT SERPL-MCNC: 0.69 MG/DL (ref 0.57–1)
EGFRCR SERPLBLD CKD-EPI 2021: 100.1 ML/MIN/1.73
EOSINOPHIL # BLD AUTO: 0.3 10*3/MM3 (ref 0–0.4)
EOSINOPHIL NFR BLD AUTO: 5.2 % (ref 0.3–6.2)
ERYTHROCYTE [DISTWIDTH] IN BLOOD BY AUTOMATED COUNT: 13 % (ref 12.3–15.4)
GLOBULIN SER CALC-MCNC: 3 GM/DL
GLUCOSE SERPL-MCNC: 87 MG/DL (ref 65–99)
HCT VFR BLD AUTO: 38.2 % (ref 34–46.6)
HGB BLD-MCNC: 12.8 G/DL (ref 12–15.9)
IMM GRANULOCYTES # BLD AUTO: 0.02 10*3/MM3 (ref 0–0.05)
IMM GRANULOCYTES NFR BLD AUTO: 0.3 % (ref 0–0.5)
LYMPHOCYTES # BLD AUTO: 1.8 10*3/MM3 (ref 0.7–3.1)
LYMPHOCYTES NFR BLD AUTO: 31.3 % (ref 19.6–45.3)
MCH RBC QN AUTO: 27.3 PG (ref 26.6–33)
MCHC RBC AUTO-ENTMCNC: 33.5 G/DL (ref 31.5–35.7)
MCV RBC AUTO: 81.4 FL (ref 79–97)
MONOCYTES # BLD AUTO: 0.53 10*3/MM3 (ref 0.1–0.9)
MONOCYTES NFR BLD AUTO: 9.2 % (ref 5–12)
NEUTROPHILS # BLD AUTO: 3.05 10*3/MM3 (ref 1.7–7)
NEUTROPHILS NFR BLD AUTO: 53.1 % (ref 42.7–76)
NRBC BLD AUTO-RTO: 0 /100 WBC (ref 0–0.2)
PLATELET # BLD AUTO: 288 10*3/MM3 (ref 140–450)
POTASSIUM SERPL-SCNC: 4.7 MMOL/L (ref 3.5–5.2)
PROT SERPL-MCNC: 7 G/DL (ref 6–8.5)
RBC # BLD AUTO: 4.69 10*6/MM3 (ref 3.77–5.28)
SODIUM SERPL-SCNC: 137 MMOL/L (ref 136–145)
T4 FREE SERPL-MCNC: 1.03 NG/DL (ref 0.92–1.68)
TSH SERPL DL<=0.005 MIU/L-ACNC: 1.84 UIU/ML (ref 0.27–4.2)
WBC # BLD AUTO: 5.75 10*3/MM3 (ref 3.4–10.8)

## 2025-03-21 NOTE — PROGRESS NOTES
"Chief Complaint  Chief Complaint   Patient presents with    Subclinical hyperthyroidism     Pt states that energy levels have been good, weight is higher than expected, does have family hx of thyroid disease.       Subjective        History of Present Illness    Barb Clinton 59 y.o. presents for a follow-up evaluation of subclinical Hyperthyroidism        Thyroid labs were checked 01/24 during an annual physical and showed low TSH at 0.162.  Repeat labs a month later in 02/24 showed low TSH with normal FT4     Family history of thyroid disease: mother (hyperthyroid) and aunt (hypothyroid), daughter (hypothyroid)  Family history of thyroid cancer: denies        She complains of depression since recent surgery, weight gain, dry skin, insomnia, hot intolerance mostly at night and intermittent tremors    Denies dry eye or double vision    Use of biotin: Denies  Current or past use of amiodarone: Denies    Smoker: Past smoker quit in 2017     Weight gain of 10 lbs since last visit.     Menopause in early to mid 40s.        Current treatment is nothing - monitoring labs  Methimazole 5 mg daily stopped due to low FT4 levels  Pt did not tolerate propranolol due to side effects      Last labs in 11/24 showed TSH 0.520, FT4 1.28       TSI and TRAB negative 03/24  Up take scan in 04/24 was normal - Most likely you have Grave's disease with negative antibodies        I have reviewed the patient's allergies, medicines, past medical hx, family hx and social hx.    Objective   Vital Signs:   /82   Pulse 84   Temp 97.9 °F (36.6 °C) (Oral)   Ht 165.1 cm (65\")   Wt 79.7 kg (175 lb 12.8 oz)   SpO2 97%   BMI 29.25 kg/m²       Physical Exam   Physical Exam  Constitutional:       General: She is not in acute distress.     Appearance: Normal appearance. She is not diaphoretic.   HENT:      Head: Normocephalic and atraumatic.   Eyes:      General:         Right eye: No discharge.         Left eye: No discharge.   Skin:     " General: Skin is warm and dry.   Neurological:      Mental Status: She is alert.   Psychiatric:         Mood and Affect: Mood normal.         Behavior: Behavior normal.                    Results Review:   TSH   Date Value Ref Range Status   11/22/2024 0.520 0.270 - 4.200 uIU/mL Final   02/14/2024 0.012 (L) 0.270 - 4.200 uIU/mL Final     T3, Total   Date Value Ref Range Status   07/22/2024 122.0 80.0 - 200.0 ng/dl Final     Comment:     Results may be falsely increased if patient taking Biotin.         Assessment and Plan  Diagnoses and all orders for this visit:    1. Subclinical hyperthyroidism (Primary)  -     CBC Auto Differential  -     Comprehensive Metabolic Panel  -     TSH  -     T4, Free      Pt states that she has started to have intermittent tremors, mostly at night, like she had before with she was over active.  Pt continues to have issues with insomnia  Pt has had a recent abdominal surgery which has limited her in her activities and she has had several family members/friends that has passed away recently.  Pt stated that she saw her PCP yesterday for her depression and they placed her on antidepressants  Pt's last labs were good, but she is having some symptoms.  Unsure if this is pt flipping back to overactive or secondary to everything going on in her life.  Will recheck labs today  Advised that if we have to go back on medication that we could do half of 5 mg tablet of methimazole.  Pt stated that she is tired of it always flipping back and forth and asked if there way anything else she could do.  Advised that there is always definitive treatment options if she wanted to go that route  Discussed Grave's disease vs Toxic nodule(s) as possible cause of overactive thyroid disease and the differences between them  Discussed definitive treatment options: 1) radioactive iodine treatment - this would render her with hypothyroidism requiring life long replacement therapy; 2) total thyroidectomy - this  would also render her with hypothyroidism requiring life long replacement therapy          Labs today  RTC in 4 months with me      Follow Up    Patient was given instructions and counseling regarding her condition or for health maintenance advice. Please see specific information pulled into the AVS if appropriate.              Coni Dobbins, MICHAELA  03/21/25

## 2025-03-28 ENCOUNTER — OFFICE VISIT (OUTPATIENT)
Dept: SURGERY | Facility: CLINIC | Age: 60
End: 2025-03-28
Payer: COMMERCIAL

## 2025-03-28 VITALS
BODY MASS INDEX: 30.46 KG/M2 | HEIGHT: 64 IN | DIASTOLIC BLOOD PRESSURE: 70 MMHG | SYSTOLIC BLOOD PRESSURE: 118 MMHG | WEIGHT: 178.4 LBS | OXYGEN SATURATION: 98 % | HEART RATE: 76 BPM

## 2025-03-28 DIAGNOSIS — D12.2 ADENOMATOUS POLYP OF ASCENDING COLON: Primary | ICD-10-CM

## 2025-03-28 PROCEDURE — 99024 POSTOP FOLLOW-UP VISIT: CPT | Performed by: COLON & RECTAL SURGERY

## 2025-03-28 NOTE — PROGRESS NOTES
"Barb Clinton is a 60 y.o. female in for follow up of Adenomatous polyp of ascending colon    History of Present Illness  The patient is status post right hemicolectomy for polyps. 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 Plan for her to do a colonoscopy in 2 years.    She reports a satisfactory recovery, with no issues related to eating, drinking, or bowel movements. She has observed a slight inconsistency in her stool, which alternates between a mushy and firm consistency but appears to be stabilizing. She experiences an urge to defecate upon waking in the morning. She has not experienced any pain, except for one instance of discomfort when she turned onto her side, which she attributes to soreness. She has a small area of concern at her belt line, which she believes is due to friction from her belt. She has been applying triple antibiotic ointment and a Band-Aid to this area as a precautionary measure.    FAMILY HISTORY  Her father  of colon cancer at the same age she is now. Her half-brother has been undergoing regular colonoscopies due to the presence of polyps.       /70 (BP Location: Left arm, Patient Position: Sitting, Cuff Size: Adult)   Pulse 76   Ht 162.6 cm (64\")   Wt 80.9 kg (178 lb 6.4 oz)   SpO2 98%   Breastfeeding No   BMI 30.62 kg/m²   Body mass index is 30.62 kg/m².      PE:   Physical Exam    Physical Exam  Exam conducted with a chaperone present.   Constitutional:       General: She is not in acute distress.     Appearance: She is well-developed.   HENT:      Head: Normocephalic and atraumatic.      Nose: Nose normal.   Eyes:      Conjunctiva/sclera: Conjunctivae normal.      Pupils: Pupils are equal, round, and reactive to light.   Neck:      Trachea: No tracheal deviation.   Pulmonary:      Effort: Pulmonary effort is normal. No respiratory distress.      Breath sounds: Normal breath sounds.   Abdominal:      General: Bowel sounds are normal. There is no distension.      " Palpations: Abdomen is soft.      Comments: Incisions on the skin are well healed. There is no evidence of hernia.   Musculoskeletal:         General: No deformity. Normal range of motion.      Cervical back: Normal range of motion.   Skin:     General: Skin is warm and dry.   Neurological:      Mental Status: She is alert and oriented to person, place, and time.      Cranial Nerves: No cranial nerve deficit.      Coordination: Coordination normal.      Gait: Gait normal.   Psychiatric:         Behavior: Behavior normal.         Judgment: Judgment normal.             Assessment & Plan  1. Postoperative status following right hemicolectomy.  The surgical incisions are healing well, with no signs of herniation. The polyps were precancerous but had not yet developed into malignancies, placing her at an elevated risk, though not the highest. She is advised to incorporate fiber into her diet, such as Metamucil, Citrucel, or FiberCon, to help regulate bowel movements. She is also recommended to apply triple antibiotic ointment and a Band-Aid to the small area of concern for approximately one week to facilitate healing. A note will be sent to her gastroenterologist today, detailing the final pathology report and her postoperative progress, and requesting that she be added to their recall list for a follow-up colonoscopy in 2 years. She is encouraged to engage in physical activities such as running and biking. If she experiences any discomfort during bowel movements or abdominal pain, she should contact us immediately. If she notices a bulge at the incision site, she should inform us promptly.       1. Adenomatous polyp of ascending colon           Patient or patient representative verbalized consent for the use of Ambient Listening during the visit with  Marizol Valladares MD for chart documentation. 4/11/2025  18:01 EDT

## 2025-06-13 ENCOUNTER — OFFICE VISIT (OUTPATIENT)
Dept: FAMILY MEDICINE CLINIC | Facility: CLINIC | Age: 60
End: 2025-06-13
Payer: COMMERCIAL

## 2025-06-13 VITALS
SYSTOLIC BLOOD PRESSURE: 130 MMHG | HEIGHT: 64 IN | DIASTOLIC BLOOD PRESSURE: 78 MMHG | HEART RATE: 84 BPM | BODY MASS INDEX: 30.11 KG/M2 | OXYGEN SATURATION: 97 % | TEMPERATURE: 97.2 F | WEIGHT: 176.4 LBS

## 2025-06-13 DIAGNOSIS — I10 PRIMARY HYPERTENSION: Primary | ICD-10-CM

## 2025-06-13 DIAGNOSIS — M19.90 OSTEOARTHRITIS, UNSPECIFIED OSTEOARTHRITIS TYPE, UNSPECIFIED SITE: ICD-10-CM

## 2025-06-13 DIAGNOSIS — E78.5 HYPERLIPIDEMIA, UNSPECIFIED HYPERLIPIDEMIA TYPE: ICD-10-CM

## 2025-06-13 DIAGNOSIS — E05.90 HYPERTHYROIDISM: ICD-10-CM

## 2025-06-13 DIAGNOSIS — G25.81 RESTLESS LEGS: ICD-10-CM

## 2025-06-13 DIAGNOSIS — J30.9 ALLERGIC RHINITIS, UNSPECIFIED SEASONALITY, UNSPECIFIED TRIGGER: ICD-10-CM

## 2025-06-13 RX ORDER — DICLOFENAC SODIUM 75 MG/1
75 TABLET, DELAYED RELEASE ORAL 2 TIMES DAILY
Qty: 180 TABLET | Refills: 1 | Status: SHIPPED | OUTPATIENT
Start: 2025-06-13

## 2025-06-13 RX ORDER — ALBUTEROL SULFATE 90 UG/1
2 INHALANT RESPIRATORY (INHALATION) EVERY 4 HOURS PRN
Qty: 18 G | Refills: 5 | Status: SHIPPED | OUTPATIENT
Start: 2025-06-13

## 2025-06-13 RX ORDER — MONTELUKAST SODIUM 10 MG/1
10 TABLET ORAL NIGHTLY
Qty: 90 TABLET | Refills: 1 | Status: SHIPPED | OUTPATIENT
Start: 2025-06-13

## 2025-06-13 RX ORDER — PRAMIPEXOLE DIHYDROCHLORIDE 1 MG/1
1 TABLET ORAL
Qty: 90 TABLET | Refills: 1 | Status: SHIPPED | OUTPATIENT
Start: 2025-06-13

## 2025-06-13 NOTE — PROGRESS NOTES
"Chief Complaint  Depression (3 month follow up)    Subjective         Barb Clinton presents to [facility]  Patient presents to the office for a 3-month follow-up regarding her depression and anxiety.  Patient does state the citalopram is working very well for her.  She does state that she would like to stay on this medication.  She does state that the restless legs seem to be getting worse.  I did discuss increasing her medication to 1 mg nightly.  He denies any other concerns or complaints at this time.    Depression    Nighttime awakenings:     PMH Includes: depression         Objective     Vitals:    06/13/25 0918   BP: 130/78   BP Location: Right arm   Patient Position: Sitting   Cuff Size: Adult   Pulse: 84   Temp: 97.2 °F (36.2 °C)   TempSrc: Temporal   SpO2: 97%   Weight: 80 kg (176 lb 6.4 oz)   Height: 162.6 cm (64\")      Body mass index is 30.28 kg/m².             Physical Exam  Vitals reviewed.   Constitutional:       Appearance: Normal appearance.   Cardiovascular:      Rate and Rhythm: Normal rate and regular rhythm.      Pulses: Normal pulses.      Heart sounds: Normal heart sounds, S1 normal and S2 normal. No murmur heard.  Pulmonary:      Effort: Pulmonary effort is normal. No respiratory distress.      Breath sounds: Normal breath sounds.   Skin:     General: Skin is warm and dry.   Neurological:      Mental Status: She is alert and oriented to person, place, and time.   Psychiatric:         Attention and Perception: Attention normal.         Mood and Affect: Mood normal.         Behavior: Behavior normal.          Result Review :   The following data was reviewed by: MICHAELA Newby on 06/13/2025:      Procedures    Assessment and Plan   Diagnoses and all orders for this visit:    1. Primary hypertension (Primary)  -     CBC (No Diff); Future  -     Comprehensive Metabolic Panel; Future  -     Lipid Panel; Future    2. Osteoarthritis, unspecified osteoarthritis " type, unspecified site  -     diclofenac (VOLTAREN) 75 MG EC tablet; Take 1 tablet by mouth 2 (Two) Times a Day.  Dispense: 180 tablet; Refill: 1    3. Allergic rhinitis, unspecified seasonality, unspecified trigger  -     montelukast (Singulair) 10 MG tablet; Take 1 tablet by mouth Every Night.  Dispense: 90 tablet; Refill: 1    4. Hyperthyroidism  -     TSH; Future  -     T4, Free; Future    5. Hyperlipidemia, unspecified hyperlipidemia type  -     CBC (No Diff); Future  -     Comprehensive Metabolic Panel; Future  -     Lipid Panel; Future    6. Restless legs  -     pramipexole (MIRAPEX) 1 MG tablet; Take 1 tablet by mouth every night at bedtime.  Dispense: 90 tablet; Refill: 1    Other orders  -     albuterol sulfate  (90 Base) MCG/ACT inhaler; Inhale 2 puffs Every 4 (Four) Hours As Needed for Wheezing.  Dispense: 18 g; Refill: 5          Follow Up   Return in about 6 months (around 12/13/2025) for Recheck.  Patient was given instructions and counseling regarding her condition or for health maintenance advice. Please see specific information pulled into the AVS if appropriate.

## 2025-07-10 DIAGNOSIS — G25.81 RESTLESS LEGS: ICD-10-CM

## 2025-07-10 RX ORDER — PRAMIPEXOLE DIHYDROCHLORIDE 0.5 MG/1
0.5 TABLET ORAL
Qty: 90 TABLET | Refills: 3 | OUTPATIENT
Start: 2025-07-10

## 2025-07-22 ENCOUNTER — OFFICE VISIT (OUTPATIENT)
Dept: ENDOCRINOLOGY | Age: 60
End: 2025-07-22
Payer: COMMERCIAL

## 2025-07-22 VITALS
DIASTOLIC BLOOD PRESSURE: 90 MMHG | HEIGHT: 64 IN | BODY MASS INDEX: 30.39 KG/M2 | HEART RATE: 60 BPM | TEMPERATURE: 97.5 F | OXYGEN SATURATION: 97 % | WEIGHT: 178 LBS | SYSTOLIC BLOOD PRESSURE: 130 MMHG

## 2025-07-22 DIAGNOSIS — G47.00 INSOMNIA, UNSPECIFIED TYPE: ICD-10-CM

## 2025-07-22 DIAGNOSIS — E05.90 SUBCLINICAL HYPERTHYROIDISM: Primary | ICD-10-CM

## 2025-07-22 LAB
T4 FREE SERPL-MCNC: 1.02 NG/DL (ref 0.92–1.68)
TSH SERPL DL<=0.005 MIU/L-ACNC: 1.26 UIU/ML (ref 0.27–4.2)

## 2025-07-22 PROCEDURE — 99214 OFFICE O/P EST MOD 30 MIN: CPT | Performed by: NURSE PRACTITIONER

## 2025-07-22 NOTE — PROGRESS NOTES
"Chief Complaint  Chief Complaint   Patient presents with    Hyperthyroidism       Subjective        History of Present Illness    Barb Clinton 60 y.o. presents for a follow-up evaluation of subclinical Hyperthyroidism        Thyroid labs were checked 01/24 during an annual physical and showed low TSH at 0.162.  Repeat labs a month later in 02/24 showed low TSH with normal FT4     Family history of thyroid disease: mother (hyperthyroid) and aunt (hypothyroid), daughter (hypothyroid)  Family history of thyroid cancer: denies          She complains of intermittent tremors, insomnia and heat intolerance.    Denies complaints of fatigue, dry eye, double vision, constipation, diarrhea, hair loss, dry skin, chest pain, shortness of breath, palpitations and cold intolerance    Use of biotin: Denies  Current or past use of amiodarone: Denies    Smoker: Past smoker quit in 2017      Weight gain of 3 lbs since last visit.    Menopause in early to mid 40s.       Current treatment is  nothing - monitoring labs  Methimazole 5 mg daily stopped due to low FT4 levels  Pt did not tolerate propranolol due to side effects        Last labs in 03/25 showed TSH 1.840, FT4 1.03        TSI and TRAB negative 03/24  Up take scan in 04/24 was normal - Most likely you have Grave's disease with negative antibodies        Last CBC in 03/25 showed normal white blood cell count/neutrophils  Last LFTs in 03/25 were normal      Objective   Vital Signs:   /90   Pulse 60   Temp 97.5 °F (36.4 °C) (Oral)   Ht 162.6 cm (64\")   Wt 80.7 kg (178 lb)   SpO2 97%   BMI 30.55 kg/m²       Physical Exam   Physical Exam  Constitutional:       General: She is not in acute distress.     Appearance: Normal appearance. She is not diaphoretic.   HENT:      Head: Normocephalic and atraumatic.   Eyes:      General:         Right eye: No discharge.         Left eye: No discharge.   Skin:     General: Skin is warm and dry.   Neurological:      Mental Status: " She is alert.   Psychiatric:         Mood and Affect: Mood normal.         Behavior: Behavior normal.                    Results Review:   TSH   Date Value Ref Range Status   03/21/2025 1.840 0.270 - 4.200 uIU/mL Final   02/14/2024 0.012 (L) 0.270 - 4.200 uIU/mL Final     T3, Total   Date Value Ref Range Status   07/22/2024 122.0 80.0 - 200.0 ng/dl Final     Comment:     Results may be falsely increased if patient taking Biotin.         Assessment and Plan  Diagnoses and all orders for this visit:    1. Subclinical hyperthyroidism (Primary)  -     TSH  -     T4, Free    Pt stated that overall she feel better  Intermittent tremors but not like they were when she first got diagnosed  Continue off medication  Check labs today      2. Insomnia, unspecified type  -     TSH  -     T4, Free    Chronic  Pt states that there has been no change in her insomnia  States that she still wakes up several times throughout the night            Labs today  RTC in 6 months with me      Follow Up    Patient was given instructions and counseling regarding her condition or for health maintenance advice. Please see specific information pulled into the AVS if appropriate.              Coni Dobbins, MICHAELA  07/22/25

## 2025-07-23 ENCOUNTER — RESULTS FOLLOW-UP (OUTPATIENT)
Dept: ENDOCRINOLOGY | Age: 60
End: 2025-07-23
Payer: COMMERCIAL

## (undated) DEVICE — DRAPE,UTILITY,TAPE,15X26,STERILE: Brand: MEDLINE

## (undated) DEVICE — VESSEL SEALER EXTEND: Brand: ENDOWRIST

## (undated) DEVICE — THE STERILE LIGHT HANDLE COVER IS USED WITH STERIS SURGICAL LIGHTING AND VISUALIZATION SYSTEMS.

## (undated) DEVICE — 1LYRTR 16FR10ML100%SILTMPS SNP: Brand: MEDLINE INDUSTRIES, INC.

## (undated) DEVICE — DISPOSABLE GRASPER CARTRIDGE: Brand: DIRECT DRIVE REPOSABLE GRASPERS

## (undated) DEVICE — LOU GENERAL ROBOT: Brand: MEDLINE INDUSTRIES, INC.

## (undated) DEVICE — 3M™ IOBAN™ 2 ANTIMICROBIAL INCISE DRAPE 6648EZ: Brand: IOBAN™ 2

## (undated) DEVICE — WOUND RETRACTOR AND PROTECTOR: Brand: ALEXIS WOUND PROTECTOR-RETRACTOR

## (undated) DEVICE — PENCL ES MEGADINE EZ/CLEAN BUTN W/HOLSTR 10FT

## (undated) DEVICE — STAPLER 60: Brand: SUREFORM

## (undated) DEVICE — YANKAUER,BULB TIP,W/O VENT,RIGID,STERILE: Brand: MEDLINE

## (undated) DEVICE — SOL ANTISTICK CAUTRY ELECTROLUBE LF

## (undated) DEVICE — GLV SURG BIOGEL LTX PF 7

## (undated) DEVICE — IRRIGATOR BULB ASEPTO 60CC STRL

## (undated) DEVICE — SUT PDS 1 CT1 36IN Z347H

## (undated) DEVICE — TUBING, SUCTION, 1/4" X 20', STRAIGHT: Brand: MEDLINE INDUSTRIES, INC.

## (undated) DEVICE — GLV SURG SENSICARE PI LF PF 7.5 GRN STRL

## (undated) DEVICE — SUT MNCRYL PLS ANTIB UD 4/0 PS2 18IN

## (undated) DEVICE — SEAL

## (undated) DEVICE — KT CLN CLEANOR SCPE

## (undated) DEVICE — SYR LL TP 10ML STRL

## (undated) DEVICE — STERILE LATEX POWDER-FREE SURGICAL GLOVESWITH NITRILE COATING: Brand: PROTEXIS

## (undated) DEVICE — SUT VIC 2/0 UR6 27IN J602H

## (undated) DEVICE — 3M™ STERI-DRAPE™ INSTRUMENT POUCH 1018L: Brand: STERI-DRAPE™

## (undated) DEVICE — COLUMN DRAPE

## (undated) DEVICE — GOWN SURG AERO CHROME XL

## (undated) DEVICE — ARM DRAPE

## (undated) DEVICE — TROC BLADLES ANCHORPORT/OPTI LP 8X120MM 1P/U

## (undated) DEVICE — EXOFIN PRECISION PEN HIGH VISCOSITY TOPICAL SKIN ADHESIVE: Brand: EXOFIN PRECISION PEN, 1G

## (undated) DEVICE — SPONGE,LAP,18"X18",DLX,XR,ST,5/PK,40/PK: Brand: MEDLINE

## (undated) DEVICE — SEALANT WND FIBRIN TISSEEL PREFIL/SYR/PRIMAFZ 4ML

## (undated) DEVICE — BLADELESS OBTURATOR: Brand: WECK VISTA

## (undated) DEVICE — SYR LUERLOK 5CC

## (undated) DEVICE — APPL HEMOS FIBRIN DUPLOTIP W/SNPLK 5MM 32CM

## (undated) DEVICE — TIP COVER ACCESSORY

## (undated) DEVICE — ENDOPATH PNEUMONEEDLE INSUFFLATION NEEDLES WITH LUER LOCK CONNECTORS 120MM: Brand: ENDOPATH

## (undated) DEVICE — ANTIBACTERIAL UNDYED BRAIDED (POLYGLACTIN 910), SYNTHETIC ABSORBABLE SUTURE: Brand: COATED VICRYL

## (undated) DEVICE — ST TBG AIRSEAL FLTR TRI LUM